# Patient Record
Sex: MALE | Race: WHITE | Employment: FULL TIME | URBAN - NONMETROPOLITAN AREA
[De-identification: names, ages, dates, MRNs, and addresses within clinical notes are randomized per-mention and may not be internally consistent; named-entity substitution may affect disease eponyms.]

---

## 2019-02-06 ENCOUNTER — HOSPITAL ENCOUNTER (EMERGENCY)
Age: 44
Discharge: ANOTHER HOSPITAL | End: 2019-02-06
Attending: EMERGENCY MEDICINE
Payer: COMMERCIAL

## 2019-02-06 ENCOUNTER — APPOINTMENT (OUTPATIENT)
Dept: GENERAL RADIOLOGY | Age: 44
End: 2019-02-06

## 2019-02-06 ENCOUNTER — HOSPITAL ENCOUNTER (INPATIENT)
Age: 44
LOS: 2 days | Discharge: HOME | DRG: 639 | End: 2019-02-08
Attending: INTERNAL MEDICINE | Admitting: INTERNAL MEDICINE

## 2019-02-06 VITALS
BODY MASS INDEX: 31.83 KG/M2 | TEMPERATURE: 99 F | DIASTOLIC BLOOD PRESSURE: 89 MMHG | OXYGEN SATURATION: 98 % | WEIGHT: 210 LBS | RESPIRATION RATE: 16 BRPM | HEART RATE: 105 BPM | SYSTOLIC BLOOD PRESSURE: 140 MMHG | HEIGHT: 68 IN

## 2019-02-06 DIAGNOSIS — E11.10 DKA, TYPE 2, NOT AT GOAL (HCC): Primary | ICD-10-CM

## 2019-02-06 DIAGNOSIS — E11.9 DIABETES MELLITUS, NEW ONSET (HCC): Primary | ICD-10-CM

## 2019-02-06 LAB
A/G RATIO: 1.1 (ref 1.1–2.2)
ALBUMIN SERPL-MCNC: 4.2 G/DL (ref 3.4–5)
ALP BLD-CCNC: 187 U/L (ref 40–129)
ALT SERPL-CCNC: <5 U/L (ref 10–40)
AMPHETAMINE SCREEN, URINE: NORMAL
ANION GAP SERPL CALCULATED.3IONS-SCNC: 17 MMOL/L (ref 3–16)
ANION GAP SERPL CALCULATED.3IONS-SCNC: 22 MMOL/L (ref 3–16)
AST SERPL-CCNC: <5 U/L (ref 15–37)
BACTERIA: ABNORMAL /HPF
BARBITURATE SCREEN URINE: NORMAL
BASE EXCESS ARTERIAL: -6.9 MMOL/L (ref -3–3)
BASOPHILS ABSOLUTE: 0.1 K/UL (ref 0–0.2)
BASOPHILS RELATIVE PERCENT: 0.4 %
BENZODIAZEPINE SCREEN, URINE: NORMAL
BILIRUB SERPL-MCNC: 0.6 MG/DL (ref 0–1)
BILIRUBIN URINE: NEGATIVE
BLOOD, URINE: ABNORMAL
BUN BLDV-MCNC: 10 MG/DL (ref 7–20)
BUN BLDV-MCNC: 7 MG/DL (ref 7–20)
CALCIUM SERPL-MCNC: 8.8 MG/DL (ref 8.3–10.6)
CALCIUM SERPL-MCNC: 9.9 MG/DL (ref 8.3–10.6)
CANNABINOID SCREEN URINE: NORMAL
CARBOXYHEMOGLOBIN ARTERIAL: 3.6 % (ref 0–1.5)
CHLORIDE BLD-SCNC: 84 MMOL/L (ref 99–110)
CHLORIDE BLD-SCNC: 97 MMOL/L (ref 99–110)
CHP ED QC CHECK: NORMAL
CLARITY: CLEAR
CO2: 17 MMOL/L (ref 21–32)
CO2: 19 MMOL/L (ref 21–32)
COCAINE METABOLITE SCREEN URINE: NORMAL
COLOR: ABNORMAL
CREAT SERPL-MCNC: 0.6 MG/DL (ref 0.9–1.3)
CREAT SERPL-MCNC: <0.5 MG/DL (ref 0.9–1.3)
EOSINOPHILS ABSOLUTE: 0.1 K/UL (ref 0–0.6)
EOSINOPHILS RELATIVE PERCENT: 0.6 %
EPITHELIAL CELLS, UA: ABNORMAL /HPF
GFR AFRICAN AMERICAN: >60
GFR AFRICAN AMERICAN: >60
GFR NON-AFRICAN AMERICAN: >60
GFR NON-AFRICAN AMERICAN: >60
GLOBULIN: 4 G/DL
GLUCOSE BLD-MCNC: 243 MG/DL (ref 70–99)
GLUCOSE BLD-MCNC: 255 MG/DL (ref 70–99)
GLUCOSE BLD-MCNC: 320 MG/DL
GLUCOSE BLD-MCNC: 320 MG/DL (ref 70–99)
GLUCOSE BLD-MCNC: 407 MG/DL
GLUCOSE BLD-MCNC: 407 MG/DL (ref 70–99)
GLUCOSE BLD-MCNC: 635 MG/DL (ref 70–99)
GLUCOSE URINE: >=1000 MG/DL
HCO3 ARTERIAL: 17.3 MMOL/L (ref 21–29)
HCT VFR BLD CALC: 44.9 % (ref 40.5–52.5)
HEMOGLOBIN, ART, EXTENDED: 15.7 G/DL (ref 13.5–17.5)
HEMOGLOBIN: 15.7 G/DL (ref 13.5–17.5)
KETONES, URINE: 40 MG/DL
LEUKOCYTE ESTERASE, URINE: NEGATIVE
LIPASE: 120 U/L (ref 13–60)
LYMPHOCYTES ABSOLUTE: 2.7 K/UL (ref 1–5.1)
LYMPHOCYTES RELATIVE PERCENT: 19.1 %
Lab: NORMAL
MCH RBC QN AUTO: 33.6 PG (ref 26–34)
MCHC RBC AUTO-ENTMCNC: 34.9 G/DL (ref 31–36)
MCV RBC AUTO: 96.2 FL (ref 80–100)
METHADONE SCREEN, URINE: NORMAL
METHEMOGLOBIN ARTERIAL: 0 %
MICROSCOPIC EXAMINATION: YES
MONO TEST: NEGATIVE
MONOCYTES ABSOLUTE: 0.9 K/UL (ref 0–1.3)
MONOCYTES RELATIVE PERCENT: 6.5 %
NEUTROPHILS ABSOLUTE: 10.3 K/UL (ref 1.7–7.7)
NEUTROPHILS RELATIVE PERCENT: 73.4 %
NITRITE, URINE: NEGATIVE
O2 CONTENT ARTERIAL: 21 ML/DL
O2 SAT, ARTERIAL: 95.7 %
O2 THERAPY: ABNORMAL
OPIATE SCREEN URINE: NORMAL
OXYCODONE URINE: NORMAL
PCO2 ARTERIAL: 31.9 MMHG (ref 35–45)
PDW BLD-RTO: 12 % (ref 12.4–15.4)
PERFORMED ON: ABNORMAL
PH ARTERIAL: 7.35 (ref 7.35–7.45)
PH UA: 5
PH UA: 5.5
PHENCYCLIDINE SCREEN URINE: NORMAL
PLATELET # BLD: 257 K/UL (ref 135–450)
PMV BLD AUTO: 8.7 FL (ref 5–10.5)
PO2 ARTERIAL: 81.3 MMHG (ref 75–108)
POTASSIUM SERPL-SCNC: 3.6 MMOL/L (ref 3.5–5.1)
POTASSIUM SERPL-SCNC: 4.2 MMOL/L (ref 3.5–5.1)
PROPOXYPHENE SCREEN: NORMAL
PROTEIN UA: NEGATIVE MG/DL
RAPID INFLUENZA  B AGN: NEGATIVE
RAPID INFLUENZA A AGN: NEGATIVE
RBC # BLD: 4.66 M/UL (ref 4.2–5.9)
RBC UA: ABNORMAL /HPF (ref 0–2)
S PYO AG THROAT QL: NEGATIVE
SODIUM BLD-SCNC: 123 MMOL/L (ref 136–145)
SODIUM BLD-SCNC: 133 MMOL/L (ref 136–145)
SPECIFIC GRAVITY UA: <=1.005
TCO2 ARTERIAL: 18.3 MMOL/L
TOTAL CK: 106 U/L (ref 39–308)
TOTAL PROTEIN: 8.2 G/DL (ref 6.4–8.2)
TROPONIN: <0.01 NG/ML
URINE TYPE: ABNORMAL
UROBILINOGEN, URINE: 0.2 E.U./DL
WBC # BLD: 14 K/UL (ref 4–11)
WBC UA: ABNORMAL /HPF (ref 0–5)

## 2019-02-06 PROCEDURE — 87880 STREP A ASSAY W/OPTIC: CPT

## 2019-02-06 PROCEDURE — 96365 THER/PROPH/DIAG IV INF INIT: CPT

## 2019-02-06 PROCEDURE — 36415 COLL VENOUS BLD VENIPUNCTURE: CPT

## 2019-02-06 PROCEDURE — 2000000000 HC ICU R&B

## 2019-02-06 PROCEDURE — 87081 CULTURE SCREEN ONLY: CPT

## 2019-02-06 PROCEDURE — 80053 COMPREHEN METABOLIC PANEL: CPT

## 2019-02-06 PROCEDURE — 93010 ELECTROCARDIOGRAM REPORT: CPT | Performed by: INTERNAL MEDICINE

## 2019-02-06 PROCEDURE — 71046 X-RAY EXAM CHEST 2 VIEWS: CPT

## 2019-02-06 PROCEDURE — 85025 COMPLETE CBC W/AUTO DIFF WBC: CPT

## 2019-02-06 PROCEDURE — 6360000002 HC RX W HCPCS: Performed by: PHYSICIAN ASSISTANT

## 2019-02-06 PROCEDURE — 84484 ASSAY OF TROPONIN QUANT: CPT

## 2019-02-06 PROCEDURE — 82803 BLOOD GASES ANY COMBINATION: CPT

## 2019-02-06 PROCEDURE — 2580000003 HC RX 258: Performed by: PHYSICIAN ASSISTANT

## 2019-02-06 PROCEDURE — 6370000000 HC RX 637 (ALT 250 FOR IP): Performed by: PHYSICIAN ASSISTANT

## 2019-02-06 PROCEDURE — 81001 URINALYSIS AUTO W/SCOPE: CPT

## 2019-02-06 PROCEDURE — 96361 HYDRATE IV INFUSION ADD-ON: CPT

## 2019-02-06 PROCEDURE — 36600 WITHDRAWAL OF ARTERIAL BLOOD: CPT

## 2019-02-06 PROCEDURE — 80048 BASIC METABOLIC PNL TOTAL CA: CPT

## 2019-02-06 PROCEDURE — 83690 ASSAY OF LIPASE: CPT

## 2019-02-06 PROCEDURE — 86308 HETEROPHILE ANTIBODY SCREEN: CPT

## 2019-02-06 PROCEDURE — 83036 HEMOGLOBIN GLYCOSYLATED A1C: CPT

## 2019-02-06 PROCEDURE — 87804 INFLUENZA ASSAY W/OPTIC: CPT

## 2019-02-06 PROCEDURE — 80307 DRUG TEST PRSMV CHEM ANLYZR: CPT

## 2019-02-06 PROCEDURE — 83735 ASSAY OF MAGNESIUM: CPT

## 2019-02-06 PROCEDURE — 82550 ASSAY OF CK (CPK): CPT

## 2019-02-06 PROCEDURE — 84100 ASSAY OF PHOSPHORUS: CPT

## 2019-02-06 PROCEDURE — 96375 TX/PRO/DX INJ NEW DRUG ADDON: CPT

## 2019-02-06 PROCEDURE — 99285 EMERGENCY DEPT VISIT HI MDM: CPT

## 2019-02-06 PROCEDURE — 93005 ELECTROCARDIOGRAM TRACING: CPT | Performed by: PHYSICIAN ASSISTANT

## 2019-02-06 RX ORDER — DEXTROSE MONOHYDRATE 50 MG/ML
100 INJECTION, SOLUTION INTRAVENOUS PRN
Status: DISCONTINUED | OUTPATIENT
Start: 2019-02-06 | End: 2019-02-06 | Stop reason: HOSPADM

## 2019-02-06 RX ORDER — 0.9 % SODIUM CHLORIDE 0.9 %
1000 INTRAVENOUS SOLUTION INTRAVENOUS ONCE
Status: COMPLETED | OUTPATIENT
Start: 2019-02-06 | End: 2019-02-06

## 2019-02-06 RX ORDER — ACETAMINOPHEN,DIPHENHYDRAMINE HCL 500; 25 MG/1; MG/1
1 TABLET, FILM COATED ORAL NIGHTLY PRN
COMMUNITY

## 2019-02-06 RX ORDER — ONDANSETRON 2 MG/ML
4 INJECTION INTRAMUSCULAR; INTRAVENOUS ONCE
Status: COMPLETED | OUTPATIENT
Start: 2019-02-06 | End: 2019-02-06

## 2019-02-06 RX ORDER — DEXTROSE MONOHYDRATE 25 G/50ML
12.5 INJECTION, SOLUTION INTRAVENOUS PRN
Status: DISCONTINUED | OUTPATIENT
Start: 2019-02-06 | End: 2019-02-06 | Stop reason: HOSPADM

## 2019-02-06 RX ORDER — NICOTINE POLACRILEX 4 MG
15 LOZENGE BUCCAL PRN
Status: DISCONTINUED | OUTPATIENT
Start: 2019-02-06 | End: 2019-02-06 | Stop reason: HOSPADM

## 2019-02-06 RX ORDER — ESOMEPRAZOLE MAGNESIUM 20 MG/1
20 FOR SUSPENSION ORAL DAILY
COMMUNITY
End: 2021-06-09

## 2019-02-06 RX ADMIN — SODIUM CHLORIDE 1000 ML: 9 INJECTION, SOLUTION INTRAVENOUS at 18:53

## 2019-02-06 RX ADMIN — SODIUM CHLORIDE 1000 ML: 9 INJECTION, SOLUTION INTRAVENOUS at 19:55

## 2019-02-06 RX ADMIN — SODIUM CHLORIDE 1000 ML: 9 INJECTION, SOLUTION INTRAVENOUS at 21:08

## 2019-02-06 RX ADMIN — ONDANSETRON 4 MG: 2 INJECTION INTRAMUSCULAR; INTRAVENOUS at 18:53

## 2019-02-06 RX ADMIN — SODIUM CHLORIDE 0.5 UNITS/HR: 900 INJECTION INTRAVENOUS at 21:08

## 2019-02-06 ASSESSMENT — PAIN DESCRIPTION - PAIN TYPE: TYPE: ACUTE PAIN

## 2019-02-06 ASSESSMENT — PAIN DESCRIPTION - ORIENTATION: ORIENTATION: RIGHT;LEFT

## 2019-02-06 ASSESSMENT — PAIN DESCRIPTION - FREQUENCY
FREQUENCY: CONTINUOUS
FREQUENCY: CONTINUOUS

## 2019-02-06 ASSESSMENT — PAIN DESCRIPTION - LOCATION
LOCATION: ABDOMEN;BACK;CHEST;LEG
LOCATION: ABDOMEN;BUTTOCKS

## 2019-02-06 ASSESSMENT — PAIN DESCRIPTION - PROGRESSION
CLINICAL_PROGRESSION: NOT CHANGED
CLINICAL_PROGRESSION: NOT CHANGED

## 2019-02-06 ASSESSMENT — PAIN SCALES - GENERAL
PAINLEVEL_OUTOF10: 7
PAINLEVEL_OUTOF10: 4

## 2019-02-06 ASSESSMENT — PAIN DESCRIPTION - DESCRIPTORS
DESCRIPTORS: JABBING
DESCRIPTORS: PRESSURE;ACHING

## 2019-02-06 ASSESSMENT — ENCOUNTER SYMPTOMS
SORE THROAT: 1
NAUSEA: 1
COUGH: 1
ABDOMINAL PAIN: 1
SHORTNESS OF BREATH: 1
VISUAL CHANGE: 1
VOICE CHANGE: 0
VOMITING: 1
BACK PAIN: 1

## 2019-02-07 LAB
ANION GAP SERPL CALCULATED.3IONS-SCNC: 10 MMOL/L (ref 3–16)
ANION GAP SERPL CALCULATED.3IONS-SCNC: 11 MMOL/L (ref 3–16)
ANION GAP SERPL CALCULATED.3IONS-SCNC: 12 MMOL/L (ref 3–16)
ANION GAP SERPL CALCULATED.3IONS-SCNC: 12 MMOL/L (ref 3–16)
ANION GAP SERPL CALCULATED.3IONS-SCNC: 13 MMOL/L (ref 3–16)
BUN BLDV-MCNC: 6 MG/DL (ref 7–20)
BUN BLDV-MCNC: 7 MG/DL (ref 7–20)
BUN BLDV-MCNC: 7 MG/DL (ref 7–20)
CALCIUM SERPL-MCNC: 8.1 MG/DL (ref 8.3–10.6)
CALCIUM SERPL-MCNC: 8.2 MG/DL (ref 8.3–10.6)
CALCIUM SERPL-MCNC: 8.2 MG/DL (ref 8.3–10.6)
CALCIUM SERPL-MCNC: 8.5 MG/DL (ref 8.3–10.6)
CALCIUM SERPL-MCNC: 8.6 MG/DL (ref 8.3–10.6)
CHLORIDE BLD-SCNC: 100 MMOL/L (ref 99–110)
CHLORIDE BLD-SCNC: 100 MMOL/L (ref 99–110)
CHLORIDE BLD-SCNC: 101 MMOL/L (ref 99–110)
CHLORIDE BLD-SCNC: 98 MMOL/L (ref 99–110)
CHLORIDE BLD-SCNC: 99 MMOL/L (ref 99–110)
CO2: 17 MMOL/L (ref 21–32)
CO2: 18 MMOL/L (ref 21–32)
CO2: 20 MMOL/L (ref 21–32)
CO2: 20 MMOL/L (ref 21–32)
CO2: 21 MMOL/L (ref 21–32)
CREAT SERPL-MCNC: 0.6 MG/DL (ref 0.9–1.3)
CREAT SERPL-MCNC: <0.5 MG/DL (ref 0.9–1.3)
EKG ATRIAL RATE: 108 BPM
EKG DIAGNOSIS: NORMAL
EKG P AXIS: 69 DEGREES
EKG P-R INTERVAL: 138 MS
EKG Q-T INTERVAL: 342 MS
EKG QRS DURATION: 88 MS
EKG QTC CALCULATION (BAZETT): 458 MS
EKG R AXIS: 64 DEGREES
EKG T AXIS: 47 DEGREES
EKG VENTRICULAR RATE: 108 BPM
ESTIMATED AVERAGE GLUCOSE: 286.2 MG/DL
ESTIMATED AVERAGE GLUCOSE: 294.8 MG/DL
GFR AFRICAN AMERICAN: >60
GFR NON-AFRICAN AMERICAN: >60
GLUCOSE BLD-MCNC: 149 MG/DL (ref 70–99)
GLUCOSE BLD-MCNC: 160 MG/DL (ref 70–99)
GLUCOSE BLD-MCNC: 160 MG/DL (ref 70–99)
GLUCOSE BLD-MCNC: 167 MG/DL (ref 70–99)
GLUCOSE BLD-MCNC: 181 MG/DL (ref 70–99)
GLUCOSE BLD-MCNC: 184 MG/DL (ref 70–99)
GLUCOSE BLD-MCNC: 186 MG/DL (ref 70–99)
GLUCOSE BLD-MCNC: 187 MG/DL (ref 70–99)
GLUCOSE BLD-MCNC: 189 MG/DL (ref 70–99)
GLUCOSE BLD-MCNC: 201 MG/DL (ref 70–99)
GLUCOSE BLD-MCNC: 206 MG/DL (ref 70–99)
GLUCOSE BLD-MCNC: 215 MG/DL (ref 70–99)
GLUCOSE BLD-MCNC: 218 MG/DL (ref 70–99)
GLUCOSE BLD-MCNC: 233 MG/DL (ref 70–99)
GLUCOSE BLD-MCNC: 233 MG/DL (ref 70–99)
GLUCOSE BLD-MCNC: 276 MG/DL (ref 70–99)
GLUCOSE BLD-MCNC: 277 MG/DL (ref 70–99)
GLUCOSE BLD-MCNC: 312 MG/DL (ref 70–99)
GLUCOSE BLD-MCNC: 342 MG/DL (ref 70–99)
HBA1C MFR BLD: 11.6 %
HBA1C MFR BLD: 11.9 %
MAGNESIUM: 1.7 MG/DL (ref 1.8–2.4)
MAGNESIUM: 1.8 MG/DL (ref 1.8–2.4)
MAGNESIUM: 1.9 MG/DL (ref 1.8–2.4)
MAGNESIUM: 2.1 MG/DL (ref 1.8–2.4)
PERFORMED ON: ABNORMAL
PHOSPHORUS: 1.8 MG/DL (ref 2.5–4.9)
PHOSPHORUS: 2 MG/DL (ref 2.5–4.9)
PHOSPHORUS: 2.4 MG/DL (ref 2.5–4.9)
PHOSPHORUS: 2.5 MG/DL (ref 2.5–4.9)
PHOSPHORUS: 2.9 MG/DL (ref 2.5–4.9)
PHOSPHORUS: 3.3 MG/DL (ref 2.5–4.9)
POTASSIUM SERPL-SCNC: 3.6 MMOL/L (ref 3.5–5.1)
POTASSIUM SERPL-SCNC: 3.7 MMOL/L (ref 3.5–5.1)
POTASSIUM SERPL-SCNC: 3.9 MMOL/L (ref 3.5–5.1)
SODIUM BLD-SCNC: 128 MMOL/L (ref 136–145)
SODIUM BLD-SCNC: 129 MMOL/L (ref 136–145)
SODIUM BLD-SCNC: 130 MMOL/L (ref 136–145)
SODIUM BLD-SCNC: 132 MMOL/L (ref 136–145)
SODIUM BLD-SCNC: 133 MMOL/L (ref 136–145)

## 2019-02-07 PROCEDURE — 84100 ASSAY OF PHOSPHORUS: CPT

## 2019-02-07 PROCEDURE — 2580000003 HC RX 258

## 2019-02-07 PROCEDURE — 6370000000 HC RX 637 (ALT 250 FOR IP): Performed by: INTERNAL MEDICINE

## 2019-02-07 PROCEDURE — 6360000002 HC RX W HCPCS: Performed by: INTERNAL MEDICINE

## 2019-02-07 PROCEDURE — 83036 HEMOGLOBIN GLYCOSYLATED A1C: CPT

## 2019-02-07 PROCEDURE — 94664 DEMO&/EVAL PT USE INHALER: CPT

## 2019-02-07 PROCEDURE — 2500000003 HC RX 250 WO HCPCS: Performed by: INTERNAL MEDICINE

## 2019-02-07 PROCEDURE — 80048 BASIC METABOLIC PNL TOTAL CA: CPT

## 2019-02-07 PROCEDURE — 99406 BEHAV CHNG SMOKING 3-10 MIN: CPT

## 2019-02-07 PROCEDURE — 6370000000 HC RX 637 (ALT 250 FOR IP): Performed by: NURSE PRACTITIONER

## 2019-02-07 PROCEDURE — G0008 ADMIN INFLUENZA VIRUS VAC: HCPCS | Performed by: INTERNAL MEDICINE

## 2019-02-07 PROCEDURE — 2580000003 HC RX 258: Performed by: INTERNAL MEDICINE

## 2019-02-07 PROCEDURE — 36415 COLL VENOUS BLD VENIPUNCTURE: CPT

## 2019-02-07 PROCEDURE — 1200000000 HC SEMI PRIVATE

## 2019-02-07 PROCEDURE — 83735 ASSAY OF MAGNESIUM: CPT

## 2019-02-07 PROCEDURE — 90686 IIV4 VACC NO PRSV 0.5 ML IM: CPT | Performed by: INTERNAL MEDICINE

## 2019-02-07 RX ORDER — SODIUM CHLORIDE 9 MG/ML
INJECTION, SOLUTION INTRAVENOUS CONTINUOUS
Status: DISCONTINUED | OUTPATIENT
Start: 2019-02-07 | End: 2019-02-07

## 2019-02-07 RX ORDER — POTASSIUM CHLORIDE 7.45 MG/ML
10 INJECTION INTRAVENOUS PRN
Status: DISCONTINUED | OUTPATIENT
Start: 2019-02-07 | End: 2019-02-08 | Stop reason: HOSPADM

## 2019-02-07 RX ORDER — NICOTINE 21 MG/24HR
1 PATCH, TRANSDERMAL 24 HOURS TRANSDERMAL EVERY 24 HOURS
Status: DISCONTINUED | OUTPATIENT
Start: 2019-02-07 | End: 2019-02-08 | Stop reason: HOSPADM

## 2019-02-07 RX ORDER — DEXTROSE MONOHYDRATE 25 G/50ML
12.5 INJECTION, SOLUTION INTRAVENOUS PRN
Status: DISCONTINUED | OUTPATIENT
Start: 2019-02-07 | End: 2019-02-08 | Stop reason: HOSPADM

## 2019-02-07 RX ORDER — DEXTROSE AND SODIUM CHLORIDE 5; .45 G/100ML; G/100ML
INJECTION, SOLUTION INTRAVENOUS CONTINUOUS PRN
Status: DISCONTINUED | OUTPATIENT
Start: 2019-02-07 | End: 2019-02-08 | Stop reason: HOSPADM

## 2019-02-07 RX ORDER — CALCIUM CARBONATE 200(500)MG
500 TABLET,CHEWABLE ORAL 3 TIMES DAILY PRN
Status: DISCONTINUED | OUTPATIENT
Start: 2019-02-07 | End: 2019-02-08 | Stop reason: HOSPADM

## 2019-02-07 RX ORDER — ACETAMINOPHEN 325 MG/1
650 TABLET ORAL EVERY 4 HOURS PRN
Status: DISCONTINUED | OUTPATIENT
Start: 2019-02-07 | End: 2019-02-08 | Stop reason: HOSPADM

## 2019-02-07 RX ORDER — SODIUM CHLORIDE 0.9 % (FLUSH) 0.9 %
SYRINGE (ML) INJECTION
Status: COMPLETED
Start: 2019-02-07 | End: 2019-02-07

## 2019-02-07 RX ORDER — INSULIN GLARGINE 100 [IU]/ML
15 INJECTION, SOLUTION SUBCUTANEOUS DAILY
Status: DISCONTINUED | OUTPATIENT
Start: 2019-02-07 | End: 2019-02-08

## 2019-02-07 RX ORDER — MAGNESIUM SULFATE 1 G/100ML
1 INJECTION INTRAVENOUS PRN
Status: DISCONTINUED | OUTPATIENT
Start: 2019-02-07 | End: 2019-02-08 | Stop reason: HOSPADM

## 2019-02-07 RX ADMIN — ANTACID TABLETS 500 MG: 500 TABLET, CHEWABLE ORAL at 22:07

## 2019-02-07 RX ADMIN — INSULIN LISPRO 6 UNITS: 100 INJECTION, SOLUTION INTRAVENOUS; SUBCUTANEOUS at 16:09

## 2019-02-07 RX ADMIN — INSULIN GLARGINE 15 UNITS: 100 INJECTION, SOLUTION SUBCUTANEOUS at 09:42

## 2019-02-07 RX ADMIN — Medication 2 MG: at 22:07

## 2019-02-07 RX ADMIN — MAGNESIUM SULFATE HEPTAHYDRATE 1 G: 1 INJECTION, SOLUTION INTRAVENOUS at 12:36

## 2019-02-07 RX ADMIN — POTASSIUM CHLORIDE 10 MEQ: 7.46 INJECTION, SOLUTION INTRAVENOUS at 06:45

## 2019-02-07 RX ADMIN — POTASSIUM CHLORIDE 10 MEQ: 7.46 INJECTION, SOLUTION INTRAVENOUS at 05:08

## 2019-02-07 RX ADMIN — POTASSIUM CHLORIDE 10 MEQ: 7.46 INJECTION, SOLUTION INTRAVENOUS at 02:21

## 2019-02-07 RX ADMIN — ENOXAPARIN SODIUM 40 MG: 40 INJECTION SUBCUTANEOUS at 08:37

## 2019-02-07 RX ADMIN — DEXTROSE AND SODIUM CHLORIDE: 5; 450 INJECTION, SOLUTION INTRAVENOUS at 13:05

## 2019-02-07 RX ADMIN — MUPIROCIN: 20 OINTMENT TOPICAL at 08:30

## 2019-02-07 RX ADMIN — POTASSIUM CHLORIDE 10 MEQ: 7.46 INJECTION, SOLUTION INTRAVENOUS at 01:03

## 2019-02-07 RX ADMIN — POTASSIUM CHLORIDE 10 MEQ: 7.46 INJECTION, SOLUTION INTRAVENOUS at 06:13

## 2019-02-07 RX ADMIN — INSULIN LISPRO 4 UNITS: 100 INJECTION, SOLUTION INTRAVENOUS; SUBCUTANEOUS at 20:24

## 2019-02-07 RX ADMIN — INFLUENZA A VIRUS A/MICHIGAN/45/2015 X-275 (H1N1) ANTIGEN (FORMALDEHYDE INACTIVATED), INFLUENZA A VIRUS A/SINGAPORE/INFIMH-16-0019/2016 IVR-186 (H3N2) ANTIGEN (FORMALDEHYDE INACTIVATED), INFLUENZA B VIRUS B/PHUKET/3073/2013 ANTIGEN (FORMALDEHYDE INACTIVATED), AND INFLUENZA B VIRUS B/MARYLAND/15/2016 BX-69A ANTIGEN (FORMALDEHYDE INACTIVATED) 0.5 ML: 15; 15; 15; 15 INJECTION, SUSPENSION INTRAMUSCULAR at 10:54

## 2019-02-07 RX ADMIN — SODIUM GLYCOLATE 15 MMOL: 216 INJECTION, SOLUTION INTRAVENOUS at 08:36

## 2019-02-07 RX ADMIN — DEXTROSE AND SODIUM CHLORIDE: 5; 450 INJECTION, SOLUTION INTRAVENOUS at 06:45

## 2019-02-07 RX ADMIN — MAGNESIUM SULFATE HEPTAHYDRATE 1 G: 1 INJECTION, SOLUTION INTRAVENOUS at 11:32

## 2019-02-07 RX ADMIN — SODIUM CHLORIDE, PRESERVATIVE FREE 10 ML: 5 INJECTION INTRAVENOUS at 11:04

## 2019-02-07 RX ADMIN — INSULIN LISPRO 4 UNITS: 100 INJECTION, SOLUTION INTRAVENOUS; SUBCUTANEOUS at 12:02

## 2019-02-07 RX ADMIN — SODIUM CHLORIDE: 9 INJECTION, SOLUTION INTRAVENOUS at 00:33

## 2019-02-07 RX ADMIN — SODIUM CHLORIDE 0.1 UNITS/KG/HR: 9 INJECTION, SOLUTION INTRAVENOUS at 00:46

## 2019-02-07 RX ADMIN — DEXTROSE AND SODIUM CHLORIDE: 5; 450 INJECTION, SOLUTION INTRAVENOUS at 00:56

## 2019-02-07 RX ADMIN — POTASSIUM CHLORIDE 10 MEQ: 7.46 INJECTION, SOLUTION INTRAVENOUS at 00:35

## 2019-02-07 RX ADMIN — ACETAMINOPHEN 650 MG: 325 TABLET ORAL at 04:07

## 2019-02-07 RX ADMIN — ACETAMINOPHEN 650 MG: 325 TABLET ORAL at 10:54

## 2019-02-07 ASSESSMENT — PAIN DESCRIPTION - PAIN TYPE
TYPE: ACUTE PAIN
TYPE: CHRONIC PAIN
TYPE: CHRONIC PAIN

## 2019-02-07 ASSESSMENT — PAIN DESCRIPTION - DESCRIPTORS
DESCRIPTORS: ACHING;HEADACHE
DESCRIPTORS: ACHING

## 2019-02-07 ASSESSMENT — PAIN DESCRIPTION - LOCATION
LOCATION: BACK;HEAD
LOCATION: BACK;NECK
LOCATION: BACK;NECK

## 2019-02-07 ASSESSMENT — PAIN DESCRIPTION - ORIENTATION: ORIENTATION: MID

## 2019-02-07 ASSESSMENT — PAIN DESCRIPTION - FREQUENCY: FREQUENCY: CONTINUOUS

## 2019-02-07 ASSESSMENT — PAIN SCALES - GENERAL
PAINLEVEL_OUTOF10: 4
PAINLEVEL_OUTOF10: 0
PAINLEVEL_OUTOF10: 7
PAINLEVEL_OUTOF10: 4
PAINLEVEL_OUTOF10: 2
PAINLEVEL_OUTOF10: 0

## 2019-02-07 ASSESSMENT — PAIN DESCRIPTION - PROGRESSION
CLINICAL_PROGRESSION: NOT CHANGED
CLINICAL_PROGRESSION: NOT CHANGED

## 2019-02-07 ASSESSMENT — PAIN - FUNCTIONAL ASSESSMENT: PAIN_FUNCTIONAL_ASSESSMENT: ACTIVITIES ARE NOT PREVENTED

## 2019-02-08 VITALS
HEART RATE: 110 BPM | DIASTOLIC BLOOD PRESSURE: 99 MMHG | RESPIRATION RATE: 16 BRPM | WEIGHT: 200.6 LBS | SYSTOLIC BLOOD PRESSURE: 149 MMHG | BODY MASS INDEX: 30.4 KG/M2 | OXYGEN SATURATION: 97 % | HEIGHT: 68 IN | TEMPERATURE: 97.3 F

## 2019-02-08 LAB
ANION GAP SERPL CALCULATED.3IONS-SCNC: 17 MMOL/L (ref 3–16)
BUN BLDV-MCNC: 8 MG/DL (ref 7–20)
CALCIUM SERPL-MCNC: 8.9 MG/DL (ref 8.3–10.6)
CHLORIDE BLD-SCNC: 98 MMOL/L (ref 99–110)
CO2: 17 MMOL/L (ref 21–32)
CREAT SERPL-MCNC: <0.5 MG/DL (ref 0.9–1.3)
ESTIMATED AVERAGE GLUCOSE: 289.1 MG/DL
GFR AFRICAN AMERICAN: >60
GFR NON-AFRICAN AMERICAN: >60
GLUCOSE BLD-MCNC: 233 MG/DL (ref 70–99)
GLUCOSE BLD-MCNC: 255 MG/DL (ref 70–99)
HBA1C MFR BLD: 11.7 %
MAGNESIUM: 2 MG/DL (ref 1.8–2.4)
PERFORMED ON: ABNORMAL
PHOSPHORUS: 2.8 MG/DL (ref 2.5–4.9)
POTASSIUM SERPL-SCNC: 3.6 MMOL/L (ref 3.5–5.1)
SODIUM BLD-SCNC: 132 MMOL/L (ref 136–145)

## 2019-02-08 PROCEDURE — 6370000000 HC RX 637 (ALT 250 FOR IP): Performed by: INTERNAL MEDICINE

## 2019-02-08 PROCEDURE — 83735 ASSAY OF MAGNESIUM: CPT

## 2019-02-08 PROCEDURE — 84100 ASSAY OF PHOSPHORUS: CPT

## 2019-02-08 PROCEDURE — 80048 BASIC METABOLIC PNL TOTAL CA: CPT

## 2019-02-08 PROCEDURE — 6370000000 HC RX 637 (ALT 250 FOR IP): Performed by: NURSE PRACTITIONER

## 2019-02-08 PROCEDURE — 36415 COLL VENOUS BLD VENIPUNCTURE: CPT

## 2019-02-08 RX ORDER — NICOTINE POLACRILEX 4 MG
15 LOZENGE BUCCAL PRN
Status: DISCONTINUED | OUTPATIENT
Start: 2019-02-08 | End: 2019-02-08 | Stop reason: HOSPADM

## 2019-02-08 RX ORDER — DEXTROSE MONOHYDRATE 25 G/50ML
12.5 INJECTION, SOLUTION INTRAVENOUS PRN
Status: DISCONTINUED | OUTPATIENT
Start: 2019-02-08 | End: 2019-02-08 | Stop reason: HOSPADM

## 2019-02-08 RX ORDER — DEXTROSE MONOHYDRATE 50 MG/ML
100 INJECTION, SOLUTION INTRAVENOUS PRN
Status: DISCONTINUED | OUTPATIENT
Start: 2019-02-08 | End: 2019-02-08 | Stop reason: HOSPADM

## 2019-02-08 RX ORDER — NICOTINE 21 MG/24HR
1 PATCH, TRANSDERMAL 24 HOURS TRANSDERMAL EVERY 24 HOURS
Qty: 30 PATCH | Refills: 3 | Status: SHIPPED | OUTPATIENT
Start: 2019-02-09 | End: 2021-06-09

## 2019-02-08 RX ORDER — PEN NEEDLE, DIABETIC 30 GX5/16"
1 NEEDLE, DISPOSABLE MISCELLANEOUS DAILY
Qty: 100 EACH | Refills: 3 | Status: SHIPPED | OUTPATIENT
Start: 2019-02-08

## 2019-02-08 RX ORDER — INSULIN GLARGINE 100 [IU]/ML
18 INJECTION, SOLUTION SUBCUTANEOUS DAILY
Status: DISCONTINUED | OUTPATIENT
Start: 2019-02-08 | End: 2019-02-08 | Stop reason: HOSPADM

## 2019-02-08 RX ORDER — PANTOPRAZOLE SODIUM 40 MG/1
40 TABLET, DELAYED RELEASE ORAL
Status: DISCONTINUED | OUTPATIENT
Start: 2019-02-08 | End: 2019-02-08 | Stop reason: HOSPADM

## 2019-02-08 RX ADMIN — INSULIN GLARGINE 18 UNITS: 100 INJECTION, SOLUTION SUBCUTANEOUS at 09:17

## 2019-02-08 RX ADMIN — ACETAMINOPHEN 650 MG: 325 TABLET ORAL at 06:36

## 2019-02-08 RX ADMIN — PANTOPRAZOLE SODIUM 40 MG: 40 TABLET, DELAYED RELEASE ORAL at 05:05

## 2019-02-08 RX ADMIN — INSULIN LISPRO 6 UNITS: 100 INJECTION, SOLUTION INTRAVENOUS; SUBCUTANEOUS at 09:16

## 2019-02-08 ASSESSMENT — PAIN SCALES - GENERAL
PAINLEVEL_OUTOF10: 0
PAINLEVEL_OUTOF10: 8

## 2019-02-09 LAB — S PYO THROAT QL CULT: NORMAL

## 2019-08-21 ENCOUNTER — HOSPITAL ENCOUNTER (EMERGENCY)
Age: 44
Discharge: HOME | End: 2019-08-21
Payer: COMMERCIAL

## 2019-08-21 ENCOUNTER — APPOINTMENT (OUTPATIENT)
Dept: GENERAL RADIOLOGY | Age: 44
End: 2019-08-21
Payer: COMMERCIAL

## 2019-08-21 VITALS
WEIGHT: 210 LBS | TEMPERATURE: 98.6 F | RESPIRATION RATE: 16 BRPM | HEART RATE: 98 BPM | DIASTOLIC BLOOD PRESSURE: 95 MMHG | HEIGHT: 68 IN | BODY MASS INDEX: 31.83 KG/M2 | OXYGEN SATURATION: 97 % | SYSTOLIC BLOOD PRESSURE: 168 MMHG

## 2019-08-21 DIAGNOSIS — R03.0 ELEVATED BLOOD PRESSURE READING: ICD-10-CM

## 2019-08-21 DIAGNOSIS — S63.501A RIGHT WRIST SPRAIN, INITIAL ENCOUNTER: Primary | ICD-10-CM

## 2019-08-21 PROCEDURE — 99283 EMERGENCY DEPT VISIT LOW MDM: CPT

## 2019-08-21 PROCEDURE — 6370000000 HC RX 637 (ALT 250 FOR IP): Performed by: PHYSICIAN ASSISTANT

## 2019-08-21 PROCEDURE — 73130 X-RAY EXAM OF HAND: CPT

## 2019-08-21 RX ORDER — IBUPROFEN 800 MG/1
800 TABLET ORAL EVERY 6 HOURS PRN
Qty: 20 TABLET | Refills: 0 | Status: SHIPPED | OUTPATIENT
Start: 2019-08-21 | End: 2022-09-17

## 2019-08-21 RX ORDER — IBUPROFEN 400 MG/1
800 TABLET ORAL ONCE
Status: COMPLETED | OUTPATIENT
Start: 2019-08-21 | End: 2019-08-21

## 2019-08-21 RX ADMIN — IBUPROFEN 800 MG: 400 TABLET ORAL at 18:50

## 2019-08-21 ASSESSMENT — PAIN SCALES - GENERAL
PAINLEVEL_OUTOF10: 8
PAINLEVEL_OUTOF10: 8

## 2019-08-21 ASSESSMENT — PAIN DESCRIPTION - DESCRIPTORS: DESCRIPTORS: ACHING;CONSTANT;DULL

## 2019-08-21 ASSESSMENT — PAIN DESCRIPTION - LOCATION: LOCATION: HAND;WRIST

## 2019-08-21 ASSESSMENT — PAIN DESCRIPTION - ORIENTATION: ORIENTATION: RIGHT

## 2019-08-21 ASSESSMENT — PAIN DESCRIPTION - PAIN TYPE: TYPE: ACUTE PAIN
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
## 2020-05-15 ENCOUNTER — TELEPHONE (OUTPATIENT)
Dept: INTERNAL MEDICINE CLINIC | Age: 45
End: 2020-05-15

## 2020-05-15 NOTE — TELEPHONE ENCOUNTER
----- Message from Candy Dang MD sent at 5/15/2020 11:44 AM EDT -----  Contact: Spouse [phone]  We never saw him , he saw different [facility]   Its not me  ----- Message -----  From: Arminda Rogel  Sent: 5/15/2020  11:28 AM EDT  To: Candy Dang MD    Hospital pt-  Pts spouse called because he was given Basaglar and needs a refill. He has no PCP. Are you able to call in a refill? [facility]  Spouse [phone]

## 2021-05-10 ENCOUNTER — HOSPITAL ENCOUNTER (OUTPATIENT)
Age: 46
Discharge: HOME | End: 2021-05-10
Payer: MEDICARE

## 2021-05-10 ENCOUNTER — HOSPITAL ENCOUNTER (OUTPATIENT)
Dept: GENERAL RADIOLOGY | Age: 46
Discharge: HOME | End: 2021-05-10
Payer: MEDICARE

## 2021-05-10 DIAGNOSIS — R06.2 WHEEZING: ICD-10-CM

## 2021-05-10 PROCEDURE — 71046 X-RAY EXAM CHEST 2 VIEWS: CPT

## 2021-05-15 ENCOUNTER — APPOINTMENT (OUTPATIENT)
Dept: GENERAL RADIOLOGY | Age: 46
End: 2021-05-15
Payer: COMMERCIAL

## 2021-05-15 ENCOUNTER — HOSPITAL ENCOUNTER (EMERGENCY)
Age: 46
Discharge: HOME | End: 2021-05-15
Attending: EMERGENCY MEDICINE
Payer: COMMERCIAL

## 2021-05-15 VITALS
DIASTOLIC BLOOD PRESSURE: 86 MMHG | TEMPERATURE: 98.5 F | SYSTOLIC BLOOD PRESSURE: 148 MMHG | WEIGHT: 210 LBS | BODY MASS INDEX: 31.83 KG/M2 | HEIGHT: 68 IN | RESPIRATION RATE: 16 BRPM | HEART RATE: 76 BPM | OXYGEN SATURATION: 98 %

## 2021-05-15 DIAGNOSIS — S96.911A STRAIN OF RIGHT FOOT, INITIAL ENCOUNTER: ICD-10-CM

## 2021-05-15 DIAGNOSIS — S86.912A MUSCLE STRAIN OF LEFT LOWER LEG, INITIAL ENCOUNTER: ICD-10-CM

## 2021-05-15 DIAGNOSIS — S63.501A SPRAIN OF RIGHT WRIST, INITIAL ENCOUNTER: ICD-10-CM
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
## 2021-05-15 PROCEDURE — 73110 X-RAY EXAM OF WRIST: CPT

## 2021-05-15 PROCEDURE — 73630 X-RAY EXAM OF FOOT: CPT

## 2021-05-15 PROCEDURE — 96372 THER/PROPH/DIAG INJ SC/IM: CPT

## 2021-05-15 PROCEDURE — 6360000002 HC RX W HCPCS: Performed by: EMERGENCY MEDICINE

## 2021-05-15 PROCEDURE — 73590 X-RAY EXAM OF LOWER LEG: CPT

## 2021-05-15 PROCEDURE — 99285 EMERGENCY DEPT VISIT HI MDM: CPT

## 2021-05-15 RX ORDER — KETOROLAC TROMETHAMINE 30 MG/ML
30 INJECTION, SOLUTION INTRAMUSCULAR; INTRAVENOUS ONCE
Status: COMPLETED | OUTPATIENT
Start: 2021-05-15 | End: 2021-05-15

## 2021-05-15 RX ADMIN — KETOROLAC TROMETHAMINE 30 MG: 30 INJECTION, SOLUTION INTRAMUSCULAR; INTRAVENOUS at 08:03

## 2021-05-15 ASSESSMENT — PAIN SCALES - GENERAL: PAINLEVEL_OUTOF10: 6

## 2021-05-15 ASSESSMENT — PAIN DESCRIPTION - FREQUENCY: FREQUENCY: CONTINUOUS

## 2021-05-15 NOTE — ED NOTES
AVS provided and reviewed with the patient. The patient verbalized understanding of care at home, follow up care, and emergent symptoms to return for. No questions or concerns verbalized at this time. The patient is alert, oriented, stable, and ambulatory out of the department at the time of discharge.        Elisabeth Adrian RN  05/15/21 5795
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
## 2021-05-15 NOTE — ED NOTES
Patient ambulatory out of the department \"to smoke\" while waiting for results.        Td Calloway RN  05/15/21 0671

## 2021-05-15 NOTE — ED NOTES
Ambulatory out of the room to ask the physician what he is waiting for. Dr. Lon Fernandez informed the patient that she is caring for a critical patient and will be in to speak with him as soon as she can. Patient then ambulatory to the restroom and returned to room.      Kieran Jha RN  05/15/21 6719

## 2021-05-27 ENCOUNTER — OFFICE VISIT (OUTPATIENT)
Dept: ORTHOPEDIC SURGERY | Age: 46
End: 2021-05-27
Payer: MEDICARE

## 2021-05-27 VITALS — BODY MASS INDEX: 31.83 KG/M2 | WEIGHT: 210 LBS | HEIGHT: 68 IN

## 2021-05-27 DIAGNOSIS — S93.601A SPRAIN OF RIGHT FOOT, INITIAL ENCOUNTER: Primary | ICD-10-CM

## 2021-05-27 PROCEDURE — 4004F PT TOBACCO SCREEN RCVD TLK: CPT | Performed by: NURSE PRACTITIONER

## 2021-05-27 PROCEDURE — G8427 DOCREV CUR MEDS BY ELIG CLIN: HCPCS | Performed by: NURSE PRACTITIONER

## 2021-05-27 PROCEDURE — 99203 OFFICE O/P NEW LOW 30 MIN: CPT | Performed by: NURSE PRACTITIONER

## 2021-05-27 PROCEDURE — G8419 CALC BMI OUT NRM PARAM NOF/U: HCPCS | Performed by: NURSE PRACTITIONER

## 2021-05-27 RX ORDER — LISINOPRIL 20 MG/1
TABLET ORAL
COMMUNITY
Start: 2021-05-26

## 2021-05-27 RX ORDER — MELOXICAM 15 MG/1
TABLET ORAL
COMMUNITY
Start: 2021-05-18 | End: 2022-09-17

## 2021-05-27 RX ORDER — CYCLOBENZAPRINE HCL 10 MG
TABLET ORAL
COMMUNITY
Start: 2021-05-18 | End: 2022-09-17

## 2021-05-27 RX ORDER — ALBUTEROL SULFATE 90 UG/1
AEROSOL, METERED RESPIRATORY (INHALATION)
COMMUNITY
Start: 2021-05-10
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
## 2021-06-09 ENCOUNTER — OFFICE VISIT (OUTPATIENT)
Dept: ORTHOPEDIC SURGERY | Age: 46
End: 2021-06-09
Payer: MEDICARE

## 2021-06-09 VITALS — WEIGHT: 210 LBS | BODY MASS INDEX: 31.83 KG/M2 | HEIGHT: 68 IN

## 2021-06-09 DIAGNOSIS — M79.671 RIGHT FOOT PAIN: Primary | ICD-10-CM

## 2021-06-09 DIAGNOSIS — S93.601A SPRAIN OF RIGHT FOOT, INITIAL ENCOUNTER: ICD-10-CM

## 2021-06-09 DIAGNOSIS — S90.31XA CONTUSION OF RIGHT FOOT, INITIAL ENCOUNTER: ICD-10-CM

## 2021-06-09 PROCEDURE — G8417 CALC BMI ABV UP PARAM F/U: HCPCS | Performed by: ORTHOPAEDIC SURGERY

## 2021-06-09 PROCEDURE — 99243 OFF/OP CNSLTJ NEW/EST LOW 30: CPT | Performed by: ORTHOPAEDIC SURGERY

## 2021-06-09 PROCEDURE — G8428 CUR MEDS NOT DOCUMENT: HCPCS | Performed by: ORTHOPAEDIC SURGERY
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
## 2021-07-07 ENCOUNTER — OFFICE VISIT (OUTPATIENT)
Dept: ORTHOPEDIC SURGERY | Age: 46
End: 2021-07-07
Payer: COMMERCIAL

## 2021-07-07 ENCOUNTER — TELEPHONE (OUTPATIENT)
Dept: ORTHOPEDIC SURGERY | Age: 46
End: 2021-07-07

## 2021-07-07 VITALS — WEIGHT: 210 LBS | BODY MASS INDEX: 31.83 KG/M2 | HEIGHT: 68 IN

## 2021-07-07 DIAGNOSIS — S90.31XD CONTUSION OF RIGHT FOOT, SUBSEQUENT ENCOUNTER: Primary | ICD-10-CM

## 2021-07-07 DIAGNOSIS — S62.021A CLOSED DISPLACED FRACTURE OF MIDDLE THIRD OF SCAPHOID BONE OF RIGHT WRIST, INITIAL ENCOUNTER: ICD-10-CM

## 2021-07-07 PROCEDURE — 99214 OFFICE O/P EST MOD 30 MIN: CPT | Performed by: ORTHOPAEDIC SURGERY

## 2021-07-07 PROCEDURE — 4004F PT TOBACCO SCREEN RCVD TLK: CPT | Performed by: ORTHOPAEDIC SURGERY

## 2021-07-07 PROCEDURE — G8417 CALC BMI ABV UP PARAM F/U: HCPCS | Performed by: ORTHOPAEDIC SURGERY

## 2021-07-07 PROCEDURE — G8428 CUR MEDS NOT DOCUMENT: HCPCS | Performed by: ORTHOPAEDIC SURGERY

## 2021-07-07 RX ORDER — BLOOD-GLUCOSE METER
EACH MISCELLANEOUS
COMMUNITY
Start: 2021-06-29
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
## 2021-07-12 ENCOUNTER — TELEPHONE (OUTPATIENT)
Dept: ORTHOPEDIC SURGERY | Age: 46
End: 2021-07-12

## 2021-07-12 NOTE — TELEPHONE ENCOUNTER
FAXED [facility] ( [place] ) 07/07/2021TO Eduin Garcia. @ [address] @ [phone]

## 2021-07-15 ENCOUNTER — TELEPHONE (OUTPATIENT)
Dept: ORTHOPEDIC SURGERY | Age: 46
End: 2021-07-15

## 2021-07-15 NOTE — TELEPHONE ENCOUNTER
[address] MRI RT FOOT  7/9/21- 7/31/2021       Tried to reach pt to notify of MRI approval  Unable to leave message mailbox was full. susana

## 2021-07-21 ENCOUNTER — TELEPHONE (OUTPATIENT)
Dept: ORTHOPEDIC SURGERY | Age: 46
End: 2021-07-21

## 2021-07-21 NOTE — TELEPHONE ENCOUNTER
Other Spoke to patient, he stated that his  needs a [facility] 14 for him for his last appointment. He is also wanting to be scheduled for his MRI with addtional questions he would like to speak to clinic about.  ph. [phone]

## 2021-07-21 NOTE — TELEPHONE ENCOUNTER
Again his mailbox is full unable to reach him. He  was given order with scheduling information on it to call and set up his testing once approved. It was attempted several times to reach the patient his voice mail is full. [employer] completed. From 6/9 and 7/7/21. susana LOPEZ approved for foot MRI see previous telephone encounters.

## 2021-08-04 ENCOUNTER — OFFICE VISIT (OUTPATIENT)
Dept: ORTHOPEDIC SURGERY | Age: 46
End: 2021-08-04
Payer: COMMERCIAL

## 2021-08-04 VITALS — HEIGHT: 68 IN | WEIGHT: 210 LBS | BODY MASS INDEX: 31.83 KG/M2

## 2021-08-04 DIAGNOSIS — M84.374S STRESS FRACTURE OF METATARSAL BONE OF RIGHT FOOT, SEQUELA: ICD-10-CM

## 2021-08-04 DIAGNOSIS — S93.601D SPRAIN OF RIGHT FOOT, SUBSEQUENT ENCOUNTER: ICD-10-CM

## 2021-08-04 DIAGNOSIS — S90.31XD CONTUSION OF RIGHT FOOT, SUBSEQUENT ENCOUNTER: Primary | ICD-10-CM

## 2021-08-04 PROCEDURE — L3260 AMBULATORY SURGICAL BOOT EAC: HCPCS | Performed by: ORTHOPAEDIC SURGERY

## 2021-08-04 PROCEDURE — 99213 OFFICE O/P EST LOW 20 MIN: CPT | Performed by: ORTHOPAEDIC SURGERY
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
## 2021-08-11 ENCOUNTER — TELEPHONE (OUTPATIENT)
Dept: ORTHOPEDIC SURGERY | Age: 46
End: 2021-08-11
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
## 2021-08-11 NOTE — TELEPHONE ENCOUNTER
Patient is here in the office today regarding  not getting correct office info. Ariel Carlson [phone]     [facility] and Tripp Rodriguez    States he needs:   MRI report Rt foot  8/4 OV notes   XRays of wrist from ER   OV notes 7/7 regarding wrist and OV notes  from Ethan villegas referral to hand for Rt  Wrist claim asked for c9 add to claim for scaphoid injury. ..

## 2021-08-25 ENCOUNTER — TELEPHONE (OUTPATIENT)
Dept: ORTHOPEDIC SURGERY | Age: 46
End: 2021-08-25

## 2021-08-25 NOTE — TELEPHONE ENCOUNTER
Received a call from [address] [place] [address]. He needed to cancel his appointment from today due to having car issues. He would like to reschedule at the [place] office. When can he be reschedule? No upcoming appointments with either Dr Hitesh Fowler.

## 2021-08-25 NOTE — TELEPHONE ENCOUNTER
Any chance CBW or Milena Brandon can see patient next week. JBW out next week for AAOS.  Soonest opening is not until 9/15 in [place]

## 2021-08-27 NOTE — TELEPHONE ENCOUNTER
Could this gentleman be worked in next week?  I don't think any other physicians in [place] will see a scaphoid FX

## 2021-09-01 ENCOUNTER — OFFICE VISIT (OUTPATIENT)
Dept: ORTHOPEDIC SURGERY | Age: 46
End: 2021-09-01
Payer: COMMERCIAL

## 2021-09-01 VITALS — HEIGHT: 68 IN | WEIGHT: 210 LBS | BODY MASS INDEX: 31.83 KG/M2

## 2021-09-01 DIAGNOSIS — M84.374S STRESS FRACTURE OF METATARSAL BONE OF RIGHT FOOT, SEQUELA: ICD-10-CM

## 2021-09-01 DIAGNOSIS — S90.31XD CONTUSION OF RIGHT FOOT, SUBSEQUENT ENCOUNTER: Primary | ICD-10-CM

## 2021-09-01 PROCEDURE — 99024 POSTOP FOLLOW-UP VISIT: CPT | Performed by: ORTHOPAEDIC SURGERY
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
## 2021-09-01 NOTE — LETTER
[address] [place] [address] [facility] [address]  Phone: [phone]  Fax: [phone]    Vaishnavi Jeffery MD        September 1, 2021     Patient: Stefania Gonzalez   YOB: 1975   Date of Visit: 9/1/2021       To Whom It May Concern: It is my medical opinion that Leslie Chaudhari may return to work on 09/15/2021 without any restrictions. If you have any questions or concerns, please don't hesitate to call.     Sincerely,      Vaishnavi Jeffery MD

## 2021-09-02 ENCOUNTER — TELEPHONE (OUTPATIENT)
Dept: ORTHOPEDIC SURGERY | Age: 46
End: 2021-09-02

## 2021-09-14 ENCOUNTER — TELEPHONE (OUTPATIENT)
Dept: ORTHOPEDIC SURGERY | Age: 46
End: 2021-09-14

## 2021-09-14 NOTE — TELEPHONE ENCOUNTER
GAVE [facility] ( MONAE NAVARRO ) 05/14/2021 TO PRESENT TO LEONIE CARDENAS TO SCAN IN O TO [address] [place] [address]    E-MAILED [facility]  REQUEST FOR ITEMIZED BILLING

## 2021-10-22 ENCOUNTER — TELEPHONE (OUTPATIENT)
Dept: ORTHOPEDIC SURGERY | Age: 46
End: 2021-10-22

## 2022-03-14 ENCOUNTER — HOSPITAL ENCOUNTER (OUTPATIENT)
Dept: CT IMAGING | Age: 47
Discharge: HOME | End: 2022-03-14
Payer: COMMERCIAL

## 2022-03-14 DIAGNOSIS — S62.009A CLOSED NONDISPLACED FRACTURE OF SCAPHOID, UNSPECIFIED LATERALITY, UNSPECIFIED PORTION OF SCAPHOID, INITIAL ENCOUNTER: ICD-10-CM

## 2022-03-14 PROCEDURE — 73200 CT UPPER EXTREMITY W/O DYE: CPT

## 2022-07-28 ENCOUNTER — HOSPITAL ENCOUNTER (OUTPATIENT)
Dept: ULTRASOUND IMAGING | Age: 47
Discharge: HOME | End: 2022-07-28

## 2022-07-28 DIAGNOSIS — J31.2 CHRONIC SORE THROAT: ICD-10-CM

## 2022-08-02 ENCOUNTER — HOSPITAL ENCOUNTER (OUTPATIENT)
Dept: ULTRASOUND IMAGING | Age: 47
Discharge: HOME | End: 2022-08-02
Payer: MEDICARE

## 2022-08-02 PROCEDURE — 76536 US EXAM OF HEAD AND NECK: CPT

## 2022-08-18 ENCOUNTER — HOSPITAL ENCOUNTER (OUTPATIENT)
Dept: CT IMAGING | Age: 47
Discharge: HOME | End: 2022-08-18
Payer: COMMERCIAL

## 2022-08-18 ENCOUNTER — OFFICE VISIT (OUTPATIENT)
Dept: ENT CLINIC | Age: 47
End: 2022-08-18
Payer: MEDICARE

## 2022-08-18 VITALS
HEIGHT: 68 IN | DIASTOLIC BLOOD PRESSURE: 92 MMHG | SYSTOLIC BLOOD PRESSURE: 155 MMHG | TEMPERATURE: 98 F | BODY MASS INDEX: 31.52 KG/M2 | HEART RATE: 122 BPM | WEIGHT: 208 LBS

## 2022-08-18 DIAGNOSIS — R49.0 DYSPHONIA: ICD-10-CM

## 2022-08-18 DIAGNOSIS — S62.009A: ICD-10-CM

## 2022-08-18 DIAGNOSIS — K21.9 GASTROESOPHAGEAL REFLUX DISEASE, UNSPECIFIED WHETHER ESOPHAGITIS PRESENT: Primary | ICD-10-CM

## 2022-08-18 DIAGNOSIS — J38.3 LEUKOPLAKIA OF VOCAL CORDS: ICD-10-CM

## 2022-08-18 DIAGNOSIS — R07.0 THROAT PAIN: ICD-10-CM

## 2022-08-18 PROCEDURE — G8427 DOCREV CUR MEDS BY ELIG CLIN: HCPCS | Performed by: OTOLARYNGOLOGY

## 2022-08-18 PROCEDURE — 31575 DIAGNOSTIC LARYNGOSCOPY: CPT | Performed by: OTOLARYNGOLOGY

## 2022-08-18 PROCEDURE — 99203 OFFICE O/P NEW LOW 30 MIN: CPT | Performed by: OTOLARYNGOLOGY

## 2022-08-18 PROCEDURE — 4004F PT TOBACCO SCREEN RCVD TLK: CPT | Performed by: OTOLARYNGOLOGY

## 2022-08-18 PROCEDURE — G8417 CALC BMI ABV UP PARAM F/U: HCPCS | Performed by: OTOLARYNGOLOGY

## 2022-08-18 PROCEDURE — 73200 CT UPPER EXTREMITY W/O DYE: CPT

## 2022-08-18 ASSESSMENT — ENCOUNTER SYMPTOMS
SORE THROAT: 1
FACIAL SWELLING: 0
VOICE CHANGE: 0
SHORTNESS OF BREATH: 0
TROUBLE SWALLOWING: 0
SINUS PRESSURE: 0
COUGH: 0
EYE ITCHING: 0
APNEA: 0

## 2022-08-18 NOTE — PROGRESS NOTES
[facility], [address], [address], [facility] Nasir Rodrigues  P: [phone]       Patient     Elena Job  1975    ChiefComplaint     Chief Complaint   Patient presents with    Pharyngitis     Patient is here today for sore throat. Patient states his throat has been hurting for 3 months. He states it is mostly when laying down at night and sleeping. He states it gets dry and sore. He has seen his PCP for it and can not find anything. Patient states when he is sleeping it is hard to swallow. His voice also changes, and will get a nasty taste in his mouth. History of Present Illness     Chantal Fay is a 80-year-old male here today for evaluation of sore throat and hoarse voice. Symptoms have been ongoing for greater than 3 months. Throat will feel fine during the day but approximately 2 hours after laying down at night he will develop significant sore throat on the left side as well as dryness and foul taste in his mouth. No history of acid reflux which she takes Nexium once a day in the morning. Drinks alcohol prior to bed, bloody Elliott Cord, and typically eats within 2 to 3 hours. Also reports his vocal quality has changed. Current daily drinker and daily smoker.     Past Medical History     Past Medical History:   Diagnosis Date    Anxiety     Behavior problem     due to pain    Bipolar 1 disorder ([facility])     Diabetes mellitus ([facility] [address]     Drug tolerance     opiod    Facet arthropathy     Fracture of cervical vertebra, C7 ([facility])     GERD (gastroesophageal reflux disease)     Hyperlipemia     Hypertension     Impingement syndrome of left shoulder     Mood disorder ([facility])     Myofascial pain     Pains, face     Spondylolysis     Substance abuse/dependence     methadone       Past Surgical History     Past Surgical History:   Procedure Laterality Date    HERNIA REPAIR      testicle    WRIST FRACTURE SURGERY         Family History     Family History   Problem Relation Age of Onset    Stroke Mother Heart Attack Father        Social History     Social History     Tobacco Use    Smoking status: Every Day     Packs/day: 1.00     Years: 30.00     Pack years: 30.00     Types: Cigarettes    Smokeless tobacco: Never   Vaping Use    Vaping Use: Never used   Substance Use Topics    Alcohol use: Yes     Alcohol/week: 10.0 standard drinks     Types: 5 Glasses of wine, 5 Shots of liquor per week     Comment: drinks on weekends     Drug use: No        Allergies     Allergies   Allergen Reactions    Tramadol Rash, Hives and Swelling       Medications     Current Outpatient Medications   Medication Sig Dispense Refill    Blood Glucose Monitoring Suppl (ONETOUCH VERIO FLEX SYSTEM) w/Device KIT       Lancet Devices (EASY MINI EJECT LANCING DEVICE) MISC       esomeprazole (NEXIUM) 20 MG delayed release capsule       lisinopril (PRINIVIL;ZESTRIL) 20 MG tablet       albuterol sulfate  (90 Base) MCG/ACT inhaler       LOSARTAN POTASSIUM PO Take by mouth      ibuprofen (ADVIL;MOTRIN) 800 MG tablet Take 1 tablet by mouth every 6 hours as needed for Pain 20 tablet 0    insulin glargine (BASAGLAR KWIKPEN) 100 UNIT/ML injection pen Inject 18 Units into the skin daily 5 pen 3    Insulin Pen Needle (PEN NEEDLES 3/16\") 31G X 5 MM MISC 1 each by Does not apply route daily 100 each 3    blood glucose test strips (ASCENSIA AUTODISC VI;ONE TOUCH ULTRA TEST VI) strip 1 each by In Vitro route daily As needed. 100 each 3    SOFT TOUCH LANCETS MISC Use once daily 100 each 3    diphenhydrAMINE-APAP, sleep, (TYLENOL PM EXTRA STRENGTH)  MG tablet Take 1 tablet by mouth nightly as needed for Sleep      meloxicam (MOBIC) 15 MG tablet  (Patient not taking: Reported on 8/18/2022)      cyclobenzaprine (FLEXERIL) 10 MG tablet  (Patient not taking: Reported on 8/18/2022)       No current facility-administered medications for this visit.        Review of Systems     Review of Systems   Constitutional:  Negative for appetite change, chills, fatigue, fever and unexpected weight change. HENT:  Positive for sore throat. Negative for congestion, ear discharge, ear pain, facial swelling, hearing loss, nosebleeds, postnasal drip, sinus pressure, sneezing, tinnitus, trouble swallowing and voice change. Eyes:  Negative for itching. Respiratory:  Negative for apnea, cough and shortness of breath. Endocrine: Negative for cold intolerance and heat intolerance. Musculoskeletal:  Negative for myalgias and neck pain. Skin:  Negative for rash. Allergic/Immunologic: Negative for environmental allergies. Neurological:  Negative for dizziness and headaches. Psychiatric/Behavioral:  Negative for confusion, decreased concentration and sleep disturbance. PhysicalExam     Vitals:    08/18/22 1041   BP: (!) 155/92   Site: Right Upper Arm   Position: Sitting   Pulse: (!) 122   Temp: 98 °F (36.7 °C)   TempSrc: Infrared   Weight: 208 lb (94.3 kg)   Height: 5' 8\" (1.727 m)       Physical Exam  Constitutional:       General: He is not in acute distress. Appearance: He is well-developed. HENT:      Head: Normocephalic and atraumatic. Right Ear: Tympanic membrane, ear canal and external ear normal. No drainage. No middle ear effusion. Tympanic membrane is not bulging. Tympanic membrane has normal mobility. Left Ear: Tympanic membrane, ear canal and external ear normal. No drainage. No middle ear effusion. Tympanic membrane is not bulging. Tympanic membrane has normal mobility. Nose: No mucosal edema or rhinorrhea. Mouth/Throat:      Lips: Pink. Mouth: Mucous membranes are moist.      Tongue: No lesions. Palate: No mass. Pharynx: Uvula midline. Eyes:      Pupils: Pupils are equal, round, and reactive to light. Neck:      Thyroid: No thyroid mass or thyromegaly. Trachea: Trachea and phonation normal.   Cardiovascular:      Pulses: Normal pulses.    Pulmonary:      Effort: Pulmonary effort is normal. No accessory muscle usage or respiratory distress. Breath sounds: No stridor. Musculoskeletal:      Cervical back: Full passive range of motion without pain. Lymphadenopathy:      Head:      Right side of head: No submental or submandibular adenopathy. Left side of head: No submental or submandibular adenopathy. Cervical: No cervical adenopathy. Right cervical: No superficial, deep or posterior cervical adenopathy. Left cervical: No superficial, deep or posterior cervical adenopathy. Skin:     General: Skin is warm and dry. Neurological:      Mental Status: He is alert and oriented to person, place, and time. Cranial Nerves: No cranial nerve deficit. Coordination: Coordination normal.      Gait: Gait normal.   Psychiatric:         Thought Content: Thought content normal.         Procedure     Flexible Laryngoscopy    Pre op: throat pain, dysphonia  Post op: leukoplakia bilateral TVC, GERD  Procedure : Flexible Laryngoscopy  Surgeon: Joanne Arreguin DO  Anesthesia: Afrin with 4% lidocaine  Indication: 68-year-old male with daily throat pain and hoarse voice laryngeal mirror examination was not tolerated due to gag reflex  Description:  The scope was passed along the floor of the right naris to the level of the larynx. The base of tongue, vallecula, epiglottis, aryepiglottic folds, arytenoids, false vocal folds, true vocal folds, or pyriform sinuses were all evaluated. True vocal folds exhibited symmetric motion bilaterally without evidence of paralysis or paresis. The scope was removed. The patient tolerated the procedure without difficulty. There were no complications. Pertinent findings: Leukoplakia bilateral true vocal cords, post cricoid thickening and erythema consistent with reflux related changes, no evidence of mass or lesion      Assessment and Plan     1. Gastroesophageal reflux disease, unspecified whether esophagitis present    2. Throat pain    3.  Leukoplakia of vocal

## 2022-09-17 ENCOUNTER — APPOINTMENT (OUTPATIENT)
Dept: GENERAL RADIOLOGY | Age: 47
DRG: 282 | End: 2022-09-17
Payer: MEDICARE

## 2022-09-17 ENCOUNTER — HOSPITAL ENCOUNTER (INPATIENT)
Age: 47
LOS: 4 days | Discharge: HOME | DRG: 282 | End: 2022-09-21
Attending: EMERGENCY MEDICINE | Admitting: INTERNAL MEDICINE
Payer: MEDICARE

## 2022-09-17 ENCOUNTER — APPOINTMENT (OUTPATIENT)
Dept: CT IMAGING | Age: 47
DRG: 282 | End: 2022-09-17
Payer: MEDICARE

## 2022-09-17 DIAGNOSIS — E08.10 DIABETIC KETOACIDOSIS WITHOUT COMA ASSOCIATED WITH DIABETES MELLITUS DUE TO UNDERLYING CONDITION (HCC): Primary | ICD-10-CM

## 2022-09-17 DIAGNOSIS — K85.20 ALCOHOL-INDUCED ACUTE PANCREATITIS WITHOUT INFECTION OR NECROSIS: ICD-10-CM

## 2022-09-17 LAB
A/G RATIO: 1.2 (ref 1.1–2.2)
ALBUMIN SERPL-MCNC: 4.4 G/DL (ref 3.4–5)
ALP BLD-CCNC: 123 U/L (ref 40–129)
ALT SERPL-CCNC: 91 U/L (ref 10–40)
ANION GAP SERPL CALCULATED.3IONS-SCNC: 16 MMOL/L (ref 3–16)
ANION GAP SERPL CALCULATED.3IONS-SCNC: 22 MMOL/L (ref 3–16)
AST SERPL-CCNC: 40 U/L (ref 15–37)
BACTERIA: ABNORMAL /HPF
BANDED NEUTROPHILS RELATIVE PERCENT: 3 % (ref 0–7)
BASE EXCESS VENOUS: -4.4 MMOL/L (ref -3–3)
BASOPHILS ABSOLUTE: 0 K/UL (ref 0–0.2)
BASOPHILS RELATIVE PERCENT: 0 %
BETA-HYDROXYBUTYRATE: 0.5 MMOL/L (ref 0–0.27)
BILIRUB SERPL-MCNC: 1.3 MG/DL (ref 0–1)
BILIRUBIN URINE: NEGATIVE
BLOOD, URINE: ABNORMAL
BUN BLDV-MCNC: 11 MG/DL (ref 7–20)
BUN BLDV-MCNC: 9 MG/DL (ref 7–20)
CALCIUM SERPL-MCNC: 9.2 MG/DL (ref 8.3–10.6)
CALCIUM SERPL-MCNC: 9.8 MG/DL (ref 8.3–10.6)
CARBOXYHEMOGLOBIN: 6.6 % (ref 0–1.5)
CHLORIDE BLD-SCNC: 81 MMOL/L (ref 99–110)
CHLORIDE BLD-SCNC: 90 MMOL/L (ref 99–110)
CLARITY: CLEAR
CO2: 19 MMOL/L (ref 21–32)
CO2: 23 MMOL/L (ref 21–32)
COLOR: YELLOW
CREAT SERPL-MCNC: 0.7 MG/DL (ref 0.9–1.3)
CREAT SERPL-MCNC: 0.8 MG/DL (ref 0.9–1.3)
EOSINOPHILS ABSOLUTE: 0 K/UL (ref 0–0.6)
EOSINOPHILS RELATIVE PERCENT: 0 %
EPITHELIAL CELLS, UA: ABNORMAL /HPF (ref 0–5)
ETHANOL: 133 MG/DL (ref 0–0.08)
GFR AFRICAN AMERICAN: >60
GFR AFRICAN AMERICAN: >60
GFR NON-AFRICAN AMERICAN: >60
GFR NON-AFRICAN AMERICAN: >60
GLUCOSE BLD-MCNC: 251 MG/DL (ref 70–99)
GLUCOSE BLD-MCNC: 256 MG/DL (ref 70–99)
GLUCOSE BLD-MCNC: 265 MG/DL (ref 70–99)
GLUCOSE BLD-MCNC: 289 MG/DL (ref 70–99)
GLUCOSE BLD-MCNC: 341 MG/DL (ref 70–99)
GLUCOSE BLD-MCNC: 656 MG/DL (ref 70–99)
GLUCOSE BLD-MCNC: >600 MG/DL (ref 70–99)
GLUCOSE URINE: >=1000 MG/DL
HCO3 VENOUS: 19.7 MMOL/L (ref 23–29)
HCT VFR BLD CALC: 46.9 % (ref 40.5–52.5)
HEMOGLOBIN: 15.9 G/DL (ref 13.5–17.5)
KETONES, URINE: NEGATIVE MG/DL
LACTIC ACID, SEPSIS: 3.7 MMOL/L (ref 0.4–1.9)
LACTIC ACID, SEPSIS: 5.5 MMOL/L (ref 0.4–1.9)
LACTIC ACID: 3.5 MMOL/L (ref 0.4–2)
LEUKOCYTE ESTERASE, URINE: NEGATIVE
LIPASE: 2917 U/L (ref 13–60)
LYMPHOCYTES ABSOLUTE: 0.8 K/UL (ref 1–5.1)
LYMPHOCYTES RELATIVE PERCENT: 9 %
MAGNESIUM: 1.9 MG/DL (ref 1.8–2.4)
MAGNESIUM: 2 MG/DL (ref 1.8–2.4)
MCH RBC QN AUTO: 33.8 PG (ref 26–34)
MCHC RBC AUTO-ENTMCNC: 34 G/DL (ref 31–36)
MCV RBC AUTO: 99.4 FL (ref 80–100)
METHEMOGLOBIN VENOUS: 0.3 %
MICROSCOPIC EXAMINATION: YES
MONOCYTES ABSOLUTE: 0.9 K/UL (ref 0–1.3)
MONOCYTES RELATIVE PERCENT: 10 %
NEUTROPHILS ABSOLUTE: 7 K/UL (ref 1.7–7.7)
NEUTROPHILS RELATIVE PERCENT: 78 %
NITRITE, URINE: NEGATIVE
O2 CONTENT, VEN: 20 VOL %
O2 SAT, VEN: 91 %
O2 THERAPY: ABNORMAL
PCO2, VEN: 34.1 MMHG (ref 40–50)
PDW BLD-RTO: 14.5 % (ref 12.4–15.4)
PERFORMED ON: ABNORMAL
PH UA: 6 (ref 5–8)
PH VENOUS: 7.38 (ref 7.35–7.45)
PHOSPHORUS: 3.5 MG/DL (ref 2.5–4.9)
PHOSPHORUS: 4.7 MG/DL (ref 2.5–4.9)
PLATELET # BLD: 166 K/UL (ref 135–450)
PLATELET SLIDE REVIEW: ADEQUATE
PMV BLD AUTO: 6.8 FL (ref 5–10.5)
PO2, VEN: 61 MMHG (ref 25–40)
POTASSIUM REFLEX MAGNESIUM: 3.9 MMOL/L (ref 3.5–5.1)
POTASSIUM REFLEX MAGNESIUM: 4.2 MMOL/L (ref 3.5–5.1)
PROTEIN UA: NEGATIVE MG/DL
RBC # BLD: 4.71 M/UL (ref 4.2–5.9)
RBC UA: ABNORMAL /HPF (ref 0–4)
SARS-COV-2, NAAT: NOT DETECTED
SLIDE REVIEW: ABNORMAL
SODIUM BLD-SCNC: 122 MMOL/L (ref 136–145)
SODIUM BLD-SCNC: 129 MMOL/L (ref 136–145)
SPECIFIC GRAVITY UA: <=1.005 (ref 1–1.03)
TCO2 CALC VENOUS: 21 MMOL/L
TOTAL PROTEIN: 8 G/DL (ref 6.4–8.2)
TROPONIN: <0.01 NG/ML
URINE REFLEX TO CULTURE: ABNORMAL
URINE TYPE: ABNORMAL
UROBILINOGEN, URINE: 0.2 E.U./DL
WBC # BLD: 8.6 K/UL (ref 4–11)
WBC UA: ABNORMAL /HPF (ref 0–5)

## 2022-09-17 PROCEDURE — 87635 SARS-COV-2 COVID-19 AMP PRB: CPT

## 2022-09-17 PROCEDURE — 6370000000 HC RX 637 (ALT 250 FOR IP): Performed by: INTERNAL MEDICINE

## 2022-09-17 PROCEDURE — 82803 BLOOD GASES ANY COMBINATION: CPT

## 2022-09-17 PROCEDURE — 83605 ASSAY OF LACTIC ACID: CPT

## 2022-09-17 PROCEDURE — 74177 CT ABD & PELVIS W/CONTRAST: CPT

## 2022-09-17 PROCEDURE — 82010 KETONE BODYS QUAN: CPT

## 2022-09-17 PROCEDURE — 84478 ASSAY OF TRIGLYCERIDES: CPT

## 2022-09-17 PROCEDURE — 84484 ASSAY OF TROPONIN QUANT: CPT

## 2022-09-17 PROCEDURE — 99285 EMERGENCY DEPT VISIT HI MDM: CPT

## 2022-09-17 PROCEDURE — 6360000002 HC RX W HCPCS: Performed by: EMERGENCY MEDICINE

## 2022-09-17 PROCEDURE — 6360000002 HC RX W HCPCS: Performed by: INTERNAL MEDICINE

## 2022-09-17 PROCEDURE — 83690 ASSAY OF LIPASE: CPT

## 2022-09-17 PROCEDURE — 83735 ASSAY OF MAGNESIUM: CPT

## 2022-09-17 PROCEDURE — 2580000003 HC RX 258: Performed by: INTERNAL MEDICINE

## 2022-09-17 PROCEDURE — 82077 ASSAY SPEC XCP UR&BREATH IA: CPT

## 2022-09-17 PROCEDURE — 36415 COLL VENOUS BLD VENIPUNCTURE: CPT

## 2022-09-17 PROCEDURE — 85025 COMPLETE CBC W/AUTO DIFF WBC: CPT

## 2022-09-17 PROCEDURE — 84100 ASSAY OF PHOSPHORUS: CPT

## 2022-09-17 PROCEDURE — 81001 URINALYSIS AUTO W/SCOPE: CPT

## 2022-09-17 PROCEDURE — 94761 N-INVAS EAR/PLS OXIMETRY MLT: CPT

## 2022-09-17 PROCEDURE — 2500000003 HC RX 250 WO HCPCS: Performed by: INTERNAL MEDICINE

## 2022-09-17 PROCEDURE — 80053 COMPREHEN METABOLIC PANEL: CPT

## 2022-09-17 PROCEDURE — 6370000000 HC RX 637 (ALT 250 FOR IP)

## 2022-09-17 PROCEDURE — 2000000000 HC ICU R&B

## 2022-09-17 PROCEDURE — 6370000000 HC RX 637 (ALT 250 FOR IP): Performed by: EMERGENCY MEDICINE

## 2022-09-17 PROCEDURE — 71045 X-RAY EXAM CHEST 1 VIEW: CPT

## 2022-09-17 PROCEDURE — 93005 ELECTROCARDIOGRAM TRACING: CPT | Performed by: EMERGENCY MEDICINE

## 2022-09-17 PROCEDURE — 6360000004 HC RX CONTRAST MEDICATION: Performed by: EMERGENCY MEDICINE

## 2022-09-17 PROCEDURE — 80307 DRUG TEST PRSMV CHEM ANLYZR: CPT

## 2022-09-17 PROCEDURE — 2580000003 HC RX 258: Performed by: EMERGENCY MEDICINE

## 2022-09-17 PROCEDURE — 2700000000 HC OXYGEN THERAPY PER DAY

## 2022-09-17 RX ORDER — ENOXAPARIN SODIUM 100 MG/ML
40 INJECTION SUBCUTANEOUS DAILY
Status: DISCONTINUED | OUTPATIENT
Start: 2022-09-18 | End: 2022-09-21 | Stop reason: HOSPADM

## 2022-09-17 RX ORDER — DEXTROSE AND SODIUM CHLORIDE 5; .45 G/100ML; G/100ML
INJECTION, SOLUTION INTRAVENOUS CONTINUOUS PRN
Status: DISCONTINUED | OUTPATIENT
Start: 2022-09-17 | End: 2022-09-18

## 2022-09-17 RX ORDER — SODIUM CHLORIDE, SODIUM LACTATE, POTASSIUM CHLORIDE, CALCIUM CHLORIDE 600; 310; 30; 20 MG/100ML; MG/100ML; MG/100ML; MG/100ML
INJECTION, SOLUTION INTRAVENOUS CONTINUOUS
Status: DISCONTINUED | OUTPATIENT
Start: 2022-09-17 | End: 2022-09-18 | Stop reason: SDUPTHER

## 2022-09-17 RX ORDER — CHLORDIAZEPOXIDE HYDROCHLORIDE 5 MG/1
CAPSULE, GELATIN COATED ORAL
Status: COMPLETED
Start: 2022-09-17 | End: 2022-09-17

## 2022-09-17 RX ORDER — POTASSIUM CHLORIDE 7.45 MG/ML
10 INJECTION INTRAVENOUS ONCE
Status: COMPLETED | OUTPATIENT
Start: 2022-09-17 | End: 2022-09-17

## 2022-09-17 RX ORDER — MORPHINE SULFATE 4 MG/ML
4 INJECTION, SOLUTION INTRAMUSCULAR; INTRAVENOUS ONCE
Status: COMPLETED | OUTPATIENT
Start: 2022-09-17 | End: 2022-09-17

## 2022-09-17 RX ORDER — CHLORDIAZEPOXIDE HYDROCHLORIDE 5 MG/1
5 CAPSULE, GELATIN COATED ORAL EVERY 6 HOURS PRN
Status: DISCONTINUED | OUTPATIENT
Start: 2022-09-17 | End: 2022-09-17

## 2022-09-17 RX ORDER — SODIUM CHLORIDE 9 MG/ML
INJECTION, SOLUTION INTRAVENOUS CONTINUOUS
Status: DISCONTINUED | OUTPATIENT
Start: 2022-09-17 | End: 2022-09-17

## 2022-09-17 RX ORDER — LISINOPRIL 20 MG/1
20 TABLET ORAL DAILY
Status: DISCONTINUED | OUTPATIENT
Start: 2022-09-17 | End: 2022-09-21 | Stop reason: HOSPADM

## 2022-09-17 RX ORDER — M-VIT,TX,IRON,MINS/CALC/FOLIC 27MG-0.4MG
1 TABLET ORAL DAILY
Status: DISCONTINUED | OUTPATIENT
Start: 2022-09-18 | End: 2022-09-18

## 2022-09-17 RX ORDER — 0.9 % SODIUM CHLORIDE 0.9 %
1000 INTRAVENOUS SOLUTION INTRAVENOUS ONCE
Status: COMPLETED | OUTPATIENT
Start: 2022-09-17 | End: 2022-09-17

## 2022-09-17 RX ORDER — SODIUM CHLORIDE 450 MG/100ML
INJECTION, SOLUTION INTRAVENOUS CONTINUOUS
Status: DISCONTINUED | OUTPATIENT
Start: 2022-09-17 | End: 2022-09-17

## 2022-09-17 RX ORDER — SODIUM CHLORIDE 9 MG/ML
INJECTION, SOLUTION INTRAVENOUS PRN
Status: DISCONTINUED | OUTPATIENT
Start: 2022-09-17 | End: 2022-09-17

## 2022-09-17 RX ORDER — LORAZEPAM 2 MG/1
2 TABLET ORAL
Status: DISCONTINUED | OUTPATIENT
Start: 2022-09-17 | End: 2022-09-21

## 2022-09-17 RX ORDER — NICOTINE 21 MG/24HR
PATCH, TRANSDERMAL 24 HOURS TRANSDERMAL
Status: DISCONTINUED
Start: 2022-09-17 | End: 2022-09-17

## 2022-09-17 RX ORDER — DIAZEPAM 5 MG/ML
5 INJECTION, SOLUTION INTRAMUSCULAR; INTRAVENOUS EVERY 4 HOURS PRN
Status: DISCONTINUED | OUTPATIENT
Start: 2022-09-17 | End: 2022-09-17

## 2022-09-17 RX ORDER — SODIUM CHLORIDE 0.9 % (FLUSH) 0.9 %
5-40 SYRINGE (ML) INJECTION EVERY 12 HOURS SCHEDULED
Status: DISCONTINUED | OUTPATIENT
Start: 2022-09-17 | End: 2022-09-17

## 2022-09-17 RX ORDER — LORAZEPAM 2 MG/ML
2 INJECTION INTRAMUSCULAR
Status: DISCONTINUED | OUTPATIENT
Start: 2022-09-17 | End: 2022-09-21

## 2022-09-17 RX ORDER — INSULIN GLARGINE 100 [IU]/ML
9 INJECTION, SOLUTION SUBCUTANEOUS NIGHTLY
Status: DISCONTINUED | OUTPATIENT
Start: 2022-09-17 | End: 2022-09-21 | Stop reason: HOSPADM

## 2022-09-17 RX ORDER — SODIUM CHLORIDE 0.9 % (FLUSH) 0.9 %
5-40 SYRINGE (ML) INJECTION PRN
Status: DISCONTINUED | OUTPATIENT
Start: 2022-09-17 | End: 2022-09-17

## 2022-09-17 RX ORDER — LABETALOL HYDROCHLORIDE 5 MG/ML
10 INJECTION, SOLUTION INTRAVENOUS EVERY 6 HOURS PRN
Status: DISCONTINUED | OUTPATIENT
Start: 2022-09-17 | End: 2022-09-18

## 2022-09-17 RX ORDER — INSULIN LISPRO 100 [IU]/ML
0-8 INJECTION, SOLUTION INTRAVENOUS; SUBCUTANEOUS
Status: DISCONTINUED | OUTPATIENT
Start: 2022-09-18 | End: 2022-09-21 | Stop reason: HOSPADM

## 2022-09-17 RX ORDER — DEXTROSE, SODIUM CHLORIDE, AND POTASSIUM CHLORIDE 5; .45; .15 G/100ML; G/100ML; G/100ML
INJECTION INTRAVENOUS CONTINUOUS PRN
Status: DISCONTINUED | OUTPATIENT
Start: 2022-09-17 | End: 2022-09-18

## 2022-09-17 RX ORDER — LORAZEPAM 2 MG/ML
3 INJECTION INTRAMUSCULAR
Status: DISCONTINUED | OUTPATIENT
Start: 2022-09-17 | End: 2022-09-21

## 2022-09-17 RX ORDER — ONDANSETRON 2 MG/ML
4 INJECTION INTRAMUSCULAR; INTRAVENOUS ONCE
Status: COMPLETED | OUTPATIENT
Start: 2022-09-17 | End: 2022-09-17

## 2022-09-17 RX ORDER — PROCHLORPERAZINE EDISYLATE 5 MG/ML
10 INJECTION INTRAMUSCULAR; INTRAVENOUS EVERY 6 HOURS PRN
Status: DISCONTINUED | OUTPATIENT
Start: 2022-09-17 | End: 2022-09-21 | Stop reason: HOSPADM

## 2022-09-17 RX ORDER — LORAZEPAM 1 MG/1
1 TABLET ORAL
Status: DISCONTINUED | OUTPATIENT
Start: 2022-09-17 | End: 2022-09-21

## 2022-09-17 RX ORDER — ALBUTEROL SULFATE 90 UG/1
2 AEROSOL, METERED RESPIRATORY (INHALATION) EVERY 6 HOURS PRN
Status: DISCONTINUED | OUTPATIENT
Start: 2022-09-17 | End: 2022-09-21 | Stop reason: HOSPADM

## 2022-09-17 RX ORDER — 0.9 % SODIUM CHLORIDE 0.9 %
15 INTRAVENOUS SOLUTION INTRAVENOUS ONCE
Status: DISCONTINUED | OUTPATIENT
Start: 2022-09-17 | End: 2022-09-17

## 2022-09-17 RX ORDER — FOLIC ACID 1 MG/1
1 TABLET ORAL DAILY
Status: DISCONTINUED | OUTPATIENT
Start: 2022-09-18 | End: 2022-09-18

## 2022-09-17 RX ORDER — LORAZEPAM 2 MG/ML
1 INJECTION INTRAMUSCULAR
Status: DISCONTINUED | OUTPATIENT
Start: 2022-09-17 | End: 2022-09-21

## 2022-09-17 RX ORDER — PANTOPRAZOLE SODIUM 40 MG/10ML
40 INJECTION, POWDER, LYOPHILIZED, FOR SOLUTION INTRAVENOUS DAILY
Status: DISCONTINUED | OUTPATIENT
Start: 2022-09-18 | End: 2022-09-21

## 2022-09-17 RX ORDER — POTASSIUM CHLORIDE 7.45 MG/ML
10 INJECTION INTRAVENOUS PRN
Status: DISCONTINUED | OUTPATIENT
Start: 2022-09-17 | End: 2022-09-18

## 2022-09-17 RX ORDER — NICOTINE 21 MG/24HR
1 PATCH, TRANSDERMAL 24 HOURS TRANSDERMAL DAILY
Status: DISCONTINUED | OUTPATIENT
Start: 2022-09-17 | End: 2022-09-21 | Stop reason: HOSPADM

## 2022-09-17 RX ORDER — MAGNESIUM SULFATE 1 G/100ML
1000 INJECTION INTRAVENOUS PRN
Status: DISCONTINUED | OUTPATIENT
Start: 2022-09-17 | End: 2022-09-21 | Stop reason: HOSPADM

## 2022-09-17 RX ORDER — LORAZEPAM 2 MG/1
4 TABLET ORAL
Status: DISCONTINUED | OUTPATIENT
Start: 2022-09-17 | End: 2022-09-21

## 2022-09-17 RX ORDER — CHLORDIAZEPOXIDE HYDROCHLORIDE 10 MG/1
10 CAPSULE, GELATIN COATED ORAL EVERY 6 HOURS
Status: DISCONTINUED | OUTPATIENT
Start: 2022-09-17 | End: 2022-09-21 | Stop reason: HOSPADM

## 2022-09-17 RX ORDER — LORAZEPAM 2 MG/ML
4 INJECTION INTRAMUSCULAR
Status: DISCONTINUED | OUTPATIENT
Start: 2022-09-17 | End: 2022-09-21

## 2022-09-17 RX ORDER — LANOLIN ALCOHOL/MO/W.PET/CERES
100 CREAM (GRAM) TOPICAL DAILY
Status: DISCONTINUED | OUTPATIENT
Start: 2022-09-18 | End: 2022-09-18

## 2022-09-17 RX ADMIN — IOPAMIDOL 75 ML: 755 INJECTION, SOLUTION INTRAVENOUS at 18:01

## 2022-09-17 RX ADMIN — INSULIN GLARGINE 9 UNITS: 100 INJECTION, SOLUTION SUBCUTANEOUS at 23:40

## 2022-09-17 RX ADMIN — ONDANSETRON HYDROCHLORIDE 4 MG: 2 INJECTION, SOLUTION INTRAMUSCULAR; INTRAVENOUS at 17:43

## 2022-09-17 RX ADMIN — CHLORDIAZEPOXIDE HYDROCHLORIDE 5 MG: 5 CAPSULE ORAL at 20:42

## 2022-09-17 RX ADMIN — Medication 2 PUFF: at 23:22

## 2022-09-17 RX ADMIN — INSULIN LISPRO 4 UNITS: 100 INJECTION, SOLUTION INTRAVENOUS; SUBCUTANEOUS at 23:40

## 2022-09-17 RX ADMIN — SODIUM CHLORIDE 1000 ML: 9 INJECTION, SOLUTION INTRAVENOUS at 18:08

## 2022-09-17 RX ADMIN — SODIUM CHLORIDE 1000 ML: 9 INJECTION, SOLUTION INTRAVENOUS at 17:42

## 2022-09-17 RX ADMIN — CHLORDIAZEPOXIDE HYDROCHLORIDE 5 MG: 5 CAPSULE, GELATIN COATED ORAL at 20:42

## 2022-09-17 RX ADMIN — LISINOPRIL 20 MG: 20 TABLET ORAL at 23:38

## 2022-09-17 RX ADMIN — MORPHINE SULFATE 4 MG: 4 INJECTION, SOLUTION INTRAMUSCULAR; INTRAVENOUS at 17:43

## 2022-09-17 RX ADMIN — SODIUM CHLORIDE 0.1 UNITS/KG/HR: 9 INJECTION, SOLUTION INTRAVENOUS at 18:14

## 2022-09-17 RX ADMIN — THIAMINE HYDROCHLORIDE: 100 INJECTION, SOLUTION INTRAMUSCULAR; INTRAVENOUS at 19:46

## 2022-09-17 RX ADMIN — POTASSIUM CHLORIDE 10 MEQ: 7.46 INJECTION, SOLUTION INTRAVENOUS at 20:27

## 2022-09-17 RX ADMIN — LABETALOL HYDROCHLORIDE 10 MG: 5 INJECTION, SOLUTION INTRAVENOUS at 22:47

## 2022-09-17 RX ADMIN — HYDROMORPHONE HYDROCHLORIDE 1 MG: 1 INJECTION, SOLUTION INTRAMUSCULAR; INTRAVENOUS; SUBCUTANEOUS at 19:41

## 2022-09-17 RX ADMIN — SODIUM CHLORIDE, POTASSIUM CHLORIDE, SODIUM LACTATE AND CALCIUM CHLORIDE: 600; 310; 30; 20 INJECTION, SOLUTION INTRAVENOUS at 23:48

## 2022-09-17 RX ADMIN — MORPHINE SULFATE 4 MG: 4 INJECTION, SOLUTION INTRAMUSCULAR; INTRAVENOUS at 18:53

## 2022-09-17 ASSESSMENT — PAIN SCALES - GENERAL
PAINLEVEL_OUTOF10: 6
PAINLEVEL_OUTOF10: 6
PAINLEVEL_OUTOF10: 7
PAINLEVEL_OUTOF10: 0
PAINLEVEL_OUTOF10: 7

## 2022-09-17 ASSESSMENT — PAIN - FUNCTIONAL ASSESSMENT
PAIN_FUNCTIONAL_ASSESSMENT: 0-10
PAIN_FUNCTIONAL_ASSESSMENT: NONE - DENIES PAIN

## 2022-09-17 ASSESSMENT — LIFESTYLE VARIABLES
HOW MANY STANDARD DRINKS CONTAINING ALCOHOL DO YOU HAVE ON A TYPICAL DAY: 7 TO 9
HOW OFTEN DO YOU HAVE A DRINK CONTAINING ALCOHOL: 4 OR MORE TIMES A WEEK

## 2022-09-17 ASSESSMENT — PAIN DESCRIPTION - FREQUENCY: FREQUENCY: CONTINUOUS

## 2022-09-17 ASSESSMENT — PAIN DESCRIPTION - DESCRIPTORS: DESCRIPTORS: CRAMPING

## 2022-09-17 ASSESSMENT — PAIN DESCRIPTION - LOCATION: LOCATION: ABDOMEN

## 2022-09-17 ASSESSMENT — PAIN DESCRIPTION - ORIENTATION: ORIENTATION: RIGHT;LEFT;LOWER;MID;UPPER

## 2022-09-17 NOTE — ED NOTES
Kim Lazo at the transfer center is paging the intensivist at [facility].       Susana   09/17/22 1841

## 2022-09-17 NOTE — LETTER
[phone] [id number]  Phone: [phone]    No name on file. September 21, 2022     Patient: Ruben Godoy   YOB: 1975   Date of Visit: 9/17/2022       To Whom It May Concern: It is my medical opinion that Taniya Calloway was admitted to [facility] on Sept 17, 2022 and discharge on September 21, 2022. If you have any questions or concerns, please don't hesitate to call.     Sincerely,        Zeinab Pulido

## 2022-09-17 NOTE — H&P
Hospital Medicine History & Physical      PCP: Geovani Lambert, APRN - CNP    Date of Service: Pt seen/examined on 9/17/22 and admitted on 9/17/22 to Inpatient. Chief Complaint   Patient presents with    Abdominal Pain     C/o generalized abd pain with N/V x 2 days       History Of Present Illness: The patient is a 52 y.o. male with PMH below, presents with abd pain, N/V, polydipsia, ETOH. Pt reports that he has had N/V x2 d. TMTC episodes, NBNB. Worsening diffuse abd pain since yd, crampy, moderate to severe, constant. Has had associated polydipsia. He says today he felt like he could not drink enough. He decided to try chocolate milk and regular [facility]. This did not solve the problem. He also tried to treat his N/V and abd pain w/ drinking extra whiskey which seemed to make his Sx worse. He is a daily drinker of 12+ beers/d. He also drinks variable amounts of whiskey daily. He says he hides the whiskey in the garage so his wife doesn't find out. He denies Hx of DT's or Sz w/ cessation but says he does get \"shaky\". Lastly, he is requiring 2L O2 after receiving 2L bolus and multiple rounds of IV pain Rx in the preceding hours. He is not normally on O2. He has been a long term smoker but denies Hx of COPD.      Past Medical History:        Diagnosis Date    Anxiety     Behavior problem     due to pain    Bipolar 1 disorder ([facility])     Diabetes mellitus ([facility] [address]     Drug tolerance     opiod    Facet arthropathy     Fracture of cervical vertebra, C7 ([facility])     GERD (gastroesophageal reflux disease)     Hyperlipemia     Hypertension     Impingement syndrome of left shoulder     Mood disorder ([facility])     Myofascial pain     Pains, face     Spondylolysis     Substance abuse/dependence     methadone       Past Surgical History:        Procedure Laterality Date    HERNIA REPAIR      testicle    WRIST FRACTURE SURGERY         Medications Prior to Admission:    Prior to Admission medications Medication Sig Start Date End Date Taking? Authorizing Provider   Blood Glucose Monitoring Suppl ([address]) w/Device KIT  6/29/21   Historical Provider, MD   Lancet Devices (EASY MINI EJECT LANCING DEVICE) MISC  6/29/21   Historical Provider, MD   esomeprazole ([address]) 20 MG delayed release capsule  4/11/21   Historical Provider, MD   lisinopril (PRINIVIL;ZESTRIL) 20 MG tablet  5/26/21   Historical Provider, MD   albuterol sulfate  (90 Base) MCG/ACT inhaler  5/10/21   Historical Provider, MD   LOSARTAN POTASSIUM PO Take by mouth    Historical Provider, MD   ibuprofen (ADVIL;MOTRIN) 800 MG tablet Take 1 tablet by mouth every 6 hours as needed for Pain 8/21/19   Praful Finney PA-C   insulin glargine (BASAGLAR KWIKPEN) 100 UNIT/ML injection pen Inject 18 Units into the skin daily 2/8/19   Annita Hathaway MD   Insulin Pen Needle (PEN NEEDLES 3/16\") 31G X 5 MM MISC 1 each by Does not apply route daily 2/8/19   Annita Hathaway MD   blood glucose test strips (ASCENSIA AUTODISC VI;ONE TOUCH ULTRA TEST VI) strip 1 each by In Vitro route daily As needed. 2/8/19   Annita Hathaway MD   SOFT TOUCH LANCETS MISC Use once daily 2/8/19   Annita Hathaway MD   diphenhydrAMINE-APAP, sleep, (TYLENOL PM EXTRA STRENGTH)  MG tablet Take 1 tablet by mouth nightly as needed for Sleep    Historical Provider, MD       Allergies:  Tramadol    Social History:    TOBACCO:   reports that he has been smoking cigarettes. He has a 30.00 pack-year smoking history. He has never used smokeless tobacco.  ETOH:   reports current alcohol use of about 10.0 standard drinks per week. Family History:  Reviewed in detail and negative for DM, Early CAD, Cancer (except as below).  Positive as follows:        Problem Relation Age of Onset    Stroke Mother     Heart Attack Father        REVIEW OF SYSTEMS:   Pertinent positives/negatives as follows: abd pain, N/V, polydipsia, ETOH., and as discussed in HPI, otherwise a complete ROS performed and all other systems are negative. PHYSICAL EXAM PERFORMED:  BP (!) 172/102   Pulse (!) 111   Temp 98.6 °F (37 °C) (Oral)   Resp 18   Ht 5' 8\" (1.727 m)   Wt 205 lb (93 kg)   SpO2 95%   BMI 31.17 kg/m²   GEN:  A&Ox3, appears ill and very dry. HEENT:  NC/AT,EOMI, dry MM, no erythema/exudates or visible masses. CVS:  Normal S1,S2. Tachy RRR. Without M/G/R.   LUNG:   Course central sounds. No wheezes, rales. Tachypnea. ABD:  Soft, + distended, mild diffuse ttp, BS+ x4. Without G/R.  EXT: 2+ pulses, no c/c/e. Brisk cap refill. PSY:  Thought process intact, affect appropriate. ADONAY:  CN III-XII grossly intact. Moves all 4 spontaneously. Sensory grossly intact. SKIN: No rash or lesions on visible skin. Chart review shows recent radiographs:  CT ABDOMEN PELVIS W IV CONTRAST Additional Contrast? None    Result Date: 9/17/2022  EXAMINATION: CT OF THE ABDOMEN AND PELVIS WITH CONTRAST 9/17/2022 5:47 pm TECHNIQUE: CT of the abdomen and pelvis was performed with the administration of intravenous contrast. Multiplanar reformatted images are provided for review. Automated exposure control, iterative reconstruction, and/or weight based adjustment of the mA/kV was utilized to reduce the radiation dose to as low as reasonably achievable. COMPARISON: 07/08/2008 HISTORY: ORDERING SYSTEM PROVIDED HISTORY: abd pain, n/v. hyperglycemia TECHNOLOGIST PROVIDED HISTORY: Additional Contrast?->None Reason for exam:->abd pain, n/v. hyperglycemia Decision Support Exception - unselect if not a suspected or confirmed emergency medical condition->Emergency Medical Condition (MA) Reason for Exam: Abdominal pain, nausea, vomiting, hyperglycemia FINDINGS: Lower Chest: There is bibasilar atelectasis. Coronary artery calcifications are a marker of atherosclerosis. Organs: The spleen, adrenal glands and kidneys are unremarkable.   The liver is enlarged measuring 23 cm in length and diffusely low in attenuation consistent with hepatic steatosis. However, there is mild-to-moderate peripancreatic inflammatory stranding due to acute pancreatitis. The pancreas enhances homogeneously. The inflammatory stranding extends along the duodenum and right upper quadrant along the ascending colon causing secondary entero colitis. There is no drainable fluid collection. GI/Bowel: There is no bowel obstruction. The appendix is within normal limits. Pelvis: The pelvic viscera are within normal limits. The bladder is moderately distended and unremarkable. Peritoneum/Retroperitoneum: A trace amount of pelvic ascites is noted with mild mesenteric edema. There is no adenopathy. Mild to moderate atherosclerosis involves the abdominal aorta and bilateral common iliac arteries. Bones/Soft Tissues: Degenerative changes involve the thoracolumbar spine. 1. Acute pancreatitis causing secondary entero colitis. 2. Trace amount of pelvic ascites and mild mesenteric edema.        EKG:    EKG 12 Lead [[id number]]    Collected: 09/17/22 1753    Updated: 09/17/22 1817     Ventricular Rate 123 BPM    Atrial Rate 123 BPM    P-R Interval 132 ms    QRS Duration 90 ms    Q-T Interval 344 ms    QTc Calculation (Bazett) 492 ms    P Axis 67 degrees    R Axis 71 degrees    T Axis 58 degrees    Diagnosis Sinus tachycardiaPossible Left atrial enlargementBorderline ECGWhen compared with ECG of 06-FEB-2019 19:17,Nonspecific T wave abnormality no longer evident in Anterolateral leads       CBC:  Recent Labs     09/17/22  1655   WBC 8.6   HGB 15.9   HCT 46.9         RENAL  Recent Labs     09/17/22  1655   *   K 3.9   CL 81*   CO2 19*   PHOS 4.7   BUN 11   CREATININE 0.8*   GLUCOSE 656*     Hemoglobin a1c:  Lab Results   Component Value Date    LABA1C 11.9 02/07/2019    LABA1C 11.6 02/06/2019    LABA1C 11.7 02/06/2019       LFT'S:  Recent Labs     09/17/22  1655   AST 40*   ALT 91*   BILITOT 1.3*   ALKPHOS 123     CARDIAC ENZYMES: Recent Labs     09/17/22  1655   TROPONINI <0.01     LACTIC ACID:  Recent Labs     09/17/22  1655   LACTSEPSIS 5.5*     U/A:  Recent Labs     09/17/22  1705   LEUKOCYTESUR Negative   BACTERIA Rare*   WBCUA None seen   COLORU Yellow   RBCUA 0-2   CLARITYU Clear   SPECGRAV <=1.005   BLOODU TRACE-INTACT*   GLUCOSEU >=1000*     VBG:  Recent Labs     09/17/22  1655   PHVEN 7.379   BQH3FUO 34.1*   NIQ5EMI 19.7*   PO2VEN 61.0*   S8KKIQIE 91        PHYSICIAN CERTIFICATION  I certify that Renetta Reynolds is expected to be hospitalized for 2 midnights based on the following assessment and plan:    ASSESSMENT/PLAN:  DKA, resolved, gap closed. Still admitted to ICU as he still appears quite ill. Initially met DKA criteria but after Tx at [facility] his AG closed. HCO3 on VBG 19.7, AG 22, , ?-hydroxybutyrate added on. DKA protocol at [facility] and stopped immediately prior to xfer 2/2 closed AG. Will leave on high dose IVF and frequent labs of q6 for now. HTN urgency, worsening since arrival to ED. Persistent tachycardia in 130's to 140's. Will give dose of home Rx and then try to use IV PRN's.  BP's are borderline for where I would start a gtt. Intensivist c/s. Admit to ICU. Acute respiratory failure with hypoxia, likely related to pain Rx vs aspiration. No CXR at [facility]. Ordered one stat for arrival here. Supplemental O2 to maintain SPO2 ? 92%, continuous pulse ox. Satting 88% on RA in the ER. Currently requiring 2 L O2. Patient normally not on O2 at home. Wean as tolerated. Acute pancreatitis, likely ETOH related. Lipase 2917. IVF per DKA protocol. Low dose Dilaudid, PRN antiemetics. On high dose ibuprofen per med rec, DC.  GI c/s. Alcohol dependence with probable mild acute intoxication, YUNIOR 133. Hx of tremors w/ cessation but no seizures/delirium. CIWA with Ativan. Banana bag x1 and then PO MVT, folate/thiamine. Fall and Seizure precautions. Denies SI/HI. Lactic acidosis, 5.5, 3.7, 3.5.   IVF and repeats ordered. Tobacco Abuse, counseled cessation, 21 mg nicotine patch. GERD, PPI converted to IV. HTN, poor control. Losartan and lisinopril on Med Rec. Pharmacy working on which one he is on. DVT Prophylaxis: Lx  Diet: NPO changed to CC clears for ON. He is able to tolerate PO. Advance in am.    Code Status: Full Code   PT/OT Eval Status: Will order if needed and as patient condition allows  Dispo - Admit to inpatient ICU    Total critical care time caring for this patient with life threatening, unstable organ failure, including direct patient contact, management of life support systems, review of data including imaging and labs, discussions with other team members and physicians is 33 minutes so far today, excluding procedures. Pam Pendleton MD    Thank you SHERIN Perry - TITUS for the opportunity to be involved in this patient's care. If you have any questions or concerns please feel free to contact me via the [employer] at [phone]. This chart was generated using the [address] [place] [address] dictation system. I created this record but it may contain dictation errors given the limitations of this technology.

## 2022-09-17 NOTE — ED PROVIDER NOTES
[address] [facility] Emergency Department      CHIEF COMPLAINT  Abdominal Pain (C/o generalized abd pain with N/V x 2 days)      HISTORY OF PRESENT ILLNESS  Rosie Perry is a 52 y.o. male with a history of bipolar disorder, diabetes, hypertension and hyperlipidemia presents with several days of intractable nausea and vomiting. He thought he may have COVID so he did a home COVID test a couple days ago that was negative. Today he developed abdominal pain that is crampy, moreso in the epigastric region. .  His vomit has been nonbloody. He reports mild constipation, no diarrhea. No chest pain or shortness of breath. He states he did take his insulin this morning but has been so thirsty that he did drink a full-strength [facility] and chocolate milk before coming to the ER. He denies fevers. No other complaints, modifying factors or associated symptoms. I have reviewed the following from the nursing documentation.     Past Medical History:   Diagnosis Date    Anxiety     Behavior problem     due to pain    Bipolar 1 disorder ([facility])     Diabetes mellitus ([facility] [address]     Drug tolerance     opiod    Facet arthropathy     Fracture of cervical vertebra, C7 ([facility])     GERD (gastroesophageal reflux disease)     Hyperlipemia     Hypertension     Impingement syndrome of left shoulder     Mood disorder ([facility])     Myofascial pain     Pains, face     Spondylolysis     Substance abuse/dependence     methadone     Past Surgical History:   Procedure Laterality Date    HERNIA REPAIR      testicle    WRIST FRACTURE SURGERY       Family History   Problem Relation Age of Onset    Stroke Mother     Heart Attack Father      Social History     Socioeconomic History    Marital status:      Spouse name: Not on file    Number of children: Not on file    Years of education: Not on file    Highest education level: Not on file   Occupational History    Not on file   Tobacco Use    Smoking status: Every Day     Packs/day: 1.00     Years: 30.00     Pack years: 30.00     Types: Cigarettes    Smokeless tobacco: Never   Vaping Use    Vaping Use: Never used   Substance and Sexual Activity    Alcohol use:  Yes     Alcohol/week: 10.0 standard drinks     Types: 5 Glasses of wine, 5 Shots of liquor per week     Comment: drinks on weekends     Drug use: No    Sexual activity: Yes     Partners: Female   Other Topics Concern    Not on file   Social History Narrative    Not on file     Social Determinants of Health     Financial Resource Strain: Not on file   Food Insecurity: Not on file   Transportation Needs: Not on file   Physical Activity: Not on file   Stress: Not on file   Social Connections: Not on file   Intimate Partner Violence: Not on file   Housing Stability: Not on file     Current Facility-Administered Medications   Medication Dose Route Frequency Provider Last Rate Last Admin    nicotine (NICODERM CQ) 14 MG/24HR             dextrose bolus 10% 125 mL  125 mL IntraVENous PRN Eusebio Mendenhall MD        Or    dextrose bolus 10% 250 mL  250 mL IntraVENous PRN Eusebio Mendenhall MD        dextrose 5 % and 0.45 % sodium chloride infusion   IntraVENous Continuous PRN Eusebio Mendenhall MD        insulin regular (HUMULIN R;NOVOLIN R) 100 Units in sodium chloride 0.9 % 100 mL infusion  0.01-0.5 Units/kg/hr IntraVENous Continuous Eusebio Mendenhall MD 9.3 mL/hr at 09/17/22 1814 0.1 Units/kg/hr at 09/17/22 1814    nicotine (NICODERM CQ) 21 MG/24HR 1 patch  1 patch TransDERmal Daily Cielo Salmeron MD         Current Outpatient Medications   Medication Sig Dispense Refill    Blood Glucose Monitoring Suppl (ONETOUCH VERIO FLEX SYSTEM) w/Device KIT       Lancet Devices (EASY MINI EJECT LANCING DEVICE) MISC       esomeprazole (NEXIUM) 20 MG delayed release capsule       lisinopril (PRINIVIL;ZESTRIL) 20 MG tablet       albuterol sulfate  (90 Base) MCG/ACT inhaler       LOSARTAN POTASSIUM PO Take by mouth      ibuprofen (ADVIL;MOTRIN) 800 MG tablet Take 1 tablet by mouth every 6 hours as needed for Pain 20 tablet 0    insulin glargine (BASAGLAR KWIKPEN) 100 UNIT/ML injection pen Inject 18 Units into the skin daily 5 pen 3    Insulin Pen Needle (PEN NEEDLES 3/16\") 31G X 5 MM MISC 1 each by Does not apply route daily 100 each 3    blood glucose test strips (ASCENSIA AUTODISC VI;ONE TOUCH ULTRA TEST VI) strip 1 each by In Vitro route daily As needed. 100 each 3    SOFT TOUCH LANCETS MISC Use once daily 100 each 3    diphenhydrAMINE-APAP, sleep, (TYLENOL PM EXTRA STRENGTH)  MG tablet Take 1 tablet by mouth nightly as needed for Sleep       Allergies   Allergen Reactions    Tramadol Rash, Hives and Swelling       REVIEW OF SYSTEMS      General:  No fevers. Malaise and excessive thirst.  Eyes:  No recent vison changes  ENT:  No sore throat, no nasal congestion  Cardiovascular:  no palpitations  Respiratory:   no cough, no wheezing  Gastrointestinal: Abdominal pain, nausea and vomiting. Musculoskeletal:  No muscle pain, no joint pain  Skin:  No rash   Neurologic:  No speech problems, no headache, no extremity numbness, no extremity weakness  Genitourinary:  No dysuria  Extremities:  no edema, no pain      Unless otherwise stated in this report, this patient's positive and negative responses for review of systems (constitutional, eyes, ENT, cardiovascular, respiratory, gastrointestinal, neurological, genitourinary, musculoskeletal, integument systems and systems related to the presenting problem) are either stated in the preceding paragraph, were not pertinent or were negative for the symptoms and/or complaints related to the medical problem. PHYSICAL EXAM  BP (!) 165/98   Pulse (!) 124   Temp 98.6 °F (37 °C) (Oral)   Resp 24   Ht 5' 8\" (1.727 m)   Wt 205 lb (93 kg)   SpO2 96%   BMI 31.17 kg/m²   GENERAL APPEARANCE: Awake and alert. Cooperative. Ill-appearing, diaphoretic. HEAD: Normocephalic. Atraumatic. EYES: PERRL. EOM's grossly intact.   ENT: Mucous membranes are dry. NECK: Supple, trachea midline. HEART: Tachycardic, normal rhythm. LUNGS: Respirations unlabored. CTAB. Good air exchange. No wheezes, rales, or rhonchi. Speaking comfortably in full sentences. ABDOMEN: Distended with diffuse tenderness. EXTREMITIES: No peripheral edema. MAEE. No acute deformities. SKIN: Intact. No acute rashes. NEUROLOGICAL: Alert and oriented X 3. CN II-XII grossly intact. Strength 5/5, sensation intact. PSYCHIATRIC: Normal mood and affect. LABS  I have reviewed all labs for this visit.    Results for orders placed or performed during the hospital encounter of 09/17/22   COVID-19, Rapid    Specimen: Nasopharyngeal Swab   Result Value Ref Range    SARS-CoV-2, NAAT Not Detected Not Detected   Blood Gas, Venous   Result Value Ref Range    pH, Mohan 7.379 7.350 - 7.450    pCO2, Mohan 34.1 (L) 40.0 - 50.0 mmHg    pO2, Mohan 61.0 (H) 25.0 - 40.0 mmHg    HCO3, Venous 19.7 (L) 23.0 - 29.0 mmol/L    Base Excess, Mohan -4.4 (L) -3.0 - 3.0 mmol/L    O2 Sat, Mohan 91 Not Established %    Carboxyhemoglobin 6.6 (H) 0.0 - 1.5 %    MetHgb, Mohan 0.3 <1.5 %    TC02 (Calc), Mohan 21 Not Established mmol/L    O2 Content, Mohan 20 Not Established VOL %    O2 Therapy Unknown    CBC with Auto Differential   Result Value Ref Range    WBC 8.6 4.0 - 11.0 K/uL    RBC 4.71 4.20 - 5.90 M/uL    Hemoglobin 15.9 13.5 - 17.5 g/dL    Hematocrit 46.9 40.5 - 52.5 %    MCV 99.4 80.0 - 100.0 fL    MCH 33.8 26.0 - 34.0 pg    MCHC 34.0 31.0 - 36.0 g/dL    RDW 14.5 12.4 - 15.4 %    Platelets 613 979 - 529 K/uL    MPV 6.8 5.0 - 10.5 fL    PLATELET SLIDE REVIEW Adequate     SLIDE REVIEW see below     Neutrophils % 78.0 %    Lymphocytes % 9.0 %    Monocytes % 10.0 %    Eosinophils % 0.0 %    Basophils % 0.0 %    Neutrophils Absolute 7.0 1.7 - 7.7 K/uL    Lymphocytes Absolute 0.8 (L) 1.0 - 5.1 K/uL    Monocytes Absolute 0.9 0.0 - 1.3 K/uL    Eosinophils Absolute 0.0 0.0 - 0.6 K/uL    Basophils Absolute 0.0 0.0 - 0.2 K/uL Bands Relative 3 0 - 7 %   Comprehensive Metabolic Panel w/ Reflex to MG   Result Value Ref Range    Sodium 122 (L) 136 - 145 mmol/L    Potassium reflex Magnesium 3.9 3.5 - 5.1 mmol/L    Chloride 81 (L) 99 - 110 mmol/L    CO2 19 (L) 21 - 32 mmol/L    Anion Gap 22 (H) 3 - 16    Glucose 656 (HH) 70 - 99 mg/dL    BUN 11 7 - 20 mg/dL    Creatinine 0.8 (L) 0.9 - 1.3 mg/dL    GFR Non-African American >60 >60    GFR African American >60 >60    Calcium 9.8 8.3 - 10.6 mg/dL    Total Protein 8.0 6.4 - 8.2 g/dL    Albumin 4.4 3.4 - 5.0 g/dL    Albumin/Globulin Ratio 1.2 1.1 - 2.2    Total Bilirubin 1.3 (H) 0.0 - 1.0 mg/dL    Alkaline Phosphatase 123 40 - 129 U/L    ALT 91 (H) 10 - 40 U/L    AST 40 (H) 15 - 37 U/L   Urinalysis with Reflex to Culture    Specimen: Urine, clean catch   Result Value Ref Range    Color, UA Yellow Straw/Yellow    Clarity, UA Clear Clear    Glucose, Ur >=1000 (A) Negative mg/dL    Bilirubin Urine Negative Negative    Ketones, Urine Negative Negative mg/dL    Specific Gravity, UA <=1.005 1.005 - 1.030    Blood, Urine TRACE-INTACT (A) Negative    pH, UA 6.0 5.0 - 8.0    Protein, UA Negative Negative mg/dL    Urobilinogen, Urine 0.2 <2.0 E.U./dL    Nitrite, Urine Negative Negative    Leukocyte Esterase, Urine Negative Negative    Microscopic Examination YES     Urine Type Voided     Urine Reflex to Culture Not Indicated    Lipase   Result Value Ref Range    Lipase 2,917.0 (H) 13.0 - 60.0 U/L   Lactate, Sepsis   Result Value Ref Range    Lactic Acid, Sepsis 5.5 (HH) 0.4 - 1.9 mmol/L   Microscopic Urinalysis   Result Value Ref Range    WBC, UA None seen 0 - 5 /HPF    RBC, UA 0-2 0 - 4 /HPF    Epithelial Cells, UA 0-1 0 - 5 /HPF    Bacteria, UA Rare (A) None Seen /HPF   Phosphorus   Result Value Ref Range    Phosphorus 4.7 2.5 - 4.9 mg/dL   Magnesium   Result Value Ref Range    Magnesium 2.00 1.80 - 2.40 mg/dL   Troponin   Result Value Ref Range    Troponin <0.01 <0.01 ng/mL   POCT Glucose   Result Value Ref Range    POC Glucose >600 (AA) 70 - 99 mg/dl    Performed on ACCU-CHEK    EKG 12 Lead   Result Value Ref Range    Ventricular Rate 123 BPM    Atrial Rate 123 BPM    P-R Interval 132 ms    QRS Duration 90 ms    Q-T Interval 344 ms    QTc Calculation (Bazett) 492 ms    P Axis 67 degrees    R Axis 71 degrees    T Axis 58 degrees    Diagnosis       Sinus tachycardiaPossible Left atrial enlargementBorderline ECGWhen compared with ECG of 06-FEB-2019 19:17,Nonspecific T wave abnormality no longer evident in Anterolateral leads       EKG  The Ekg interpreted by myself  sinus tachycardia, igef=[id number]  with a rate of 123  Axis is   Normal  QTc is  normal  Intervals and Durations are unremarkable. No specific ST-T wave changes appreciated. No evidence of acute ischemia. No significant change from prior EKG dated February 6, 2019    Cardiac Monitoring: The cardiac monitor revealed sinus tachycardia as interpreted by me. The cardiac monitor was ordered secondary to the patient's complaint of nausea and vomiting and to monitor the patient for dysrhythmia. RADIOLOGY  X-RAYS: ALL IMAGES INCLUDING PLAIN FILMS, CT, ULTRASOUND AND MRI HAVE BEEN READ BY THE RADIOLOGIST. I have personally reviewed plain film images and have reviewed the radiology reports. CT ABDOMEN PELVIS W IV CONTRAST Additional Contrast? None   Final Result   1. Acute pancreatitis causing secondary entero colitis. 2. Trace amount of pelvic ascites and mild mesenteric edema. Rechecks: Physical assessment performed. Patient's heart rate is slowly starting to respond to IV fluids. Has been updated on his CT and lab work findings. He is agreeable to admission.         Critical Care:I personally spent a total of 60 minutes of critical care time in obtaining history, performing a physical exam, bedside monitoring of interventions, collecting and interpreting tests and discussion with consultants but excluding time spent performing procedures, treating other patients and teaching time. Sepsis:  Is this patient to be included in the SEP-1 Core Measure due to severe sepsis or septic shock? No   Exclusion criteria - the patient is NOT to be included for SEP-1 Core Measure due to: Alternative explanation for abnormal labs/vitals that do not relate to sepsis, see MDM for further explanation           ED COURSE/MDM  Patient seen and evaluated. Here the patient is afebrile with tachycardia and tachypnea on arrival. old records reviewed. Here the patient appears dehydrated and is tachycardic. His abdomen is diffusely tender but most of his pain is in the epigastric region. Mild distention. Blood glucose on arrival is reading \"high\" on the glucometer. He has a history of DKA in the past.  Lab work is showing evidence of DKA with an elevated anion gap and slightly depressed CO2. Beta hydroxybutyrate is pending. Lab work is also significant for elevated lipase greater than 2000. EKG shows sinus tachycardia, troponin is negative. Magnesium and phosphorus are normal.  Urinalysis shows no UTI, COVID test is negative. CT scan is showing acute pancreatitis. On further questioning the patient does admit that he drinks alcohol every day. He states he does get shaky when he does not get a drink so I will place him on CIWA protocol. He has been given 2 L of normal saline and has been started on an insulin drip. He has been made n.p.o. He was requesting a nicotine patch which I have given as well. I will transfer him to the ICU at [facility] and have reached out to both the hospitalist and the intensivist.  Labs and imaging reviewed and results discussed with patient. Patient was reassessed as noted above . Plan of care discussed with patient. Patient in agreement with plan. CLINICAL IMPRESSION  1.  Diabetic ketoacidosis without coma associated with diabetes mellitus due to underlying condition ([facility] [address]    2. Alcohol-induced acute pancreatitis without infection or necrosis        Blood pressure (!) 165/98, pulse (!) 124, temperature 98.6 °F (37 °C), temperature source Oral, resp. rate 24, height 5' 8\" (1.727 m), weight 205 lb (93 kg), SpO2 96 %. DISPOSITION  Miley Dubois was admitted in stable condition. Lina Velazquez MD am the primary clinician of record.     (Please note this note was completed with a voice recognition program.  Efforts were made to edit the dictations but occasionally words are mis-transcribed.)        Zuhair Burt MD  09/17/22 [id number]

## 2022-09-17 NOTE — ED NOTES
Pts wife into tx rm.  Pt remains A & O x 3 and without s/s distress, resps even and unlab     Pedro Mandel RN  09/17/22 2029

## 2022-09-18 ENCOUNTER — APPOINTMENT (OUTPATIENT)
Dept: ULTRASOUND IMAGING | Age: 47
DRG: 282 | End: 2022-09-18
Payer: MEDICARE

## 2022-09-18 PROBLEM — I10 PRIMARY HYPERTENSION: Status: ACTIVE | Noted: 2022-09-18

## 2022-09-18 PROBLEM — K85.20 ALCOHOL-INDUCED ACUTE PANCREATITIS WITHOUT INFECTION OR NECROSIS: Status: ACTIVE | Noted: 2022-09-18

## 2022-09-18 PROBLEM — F10.930 ALCOHOL WITHDRAWAL SYNDROME WITHOUT COMPLICATION (HCC): Status: ACTIVE | Noted: 2022-09-18

## 2022-09-18 PROBLEM — E08.10 DIABETIC KETOACIDOSIS WITHOUT COMA ASSOCIATED WITH DIABETES MELLITUS DUE TO UNDERLYING CONDITION (HCC): Status: ACTIVE | Noted: 2022-09-18

## 2022-09-18 LAB
ALBUMIN SERPL-MCNC: 4.1 G/DL (ref 3.4–5)
ALP BLD-CCNC: 96 U/L (ref 40–129)
ALT SERPL-CCNC: 72 U/L (ref 10–40)
AMPHETAMINE SCREEN, URINE: ABNORMAL
ANION GAP SERPL CALCULATED.3IONS-SCNC: 11 MMOL/L (ref 3–16)
ANION GAP SERPL CALCULATED.3IONS-SCNC: 11 MMOL/L (ref 3–16)
ANION GAP SERPL CALCULATED.3IONS-SCNC: 9 MMOL/L (ref 3–16)
AST SERPL-CCNC: 48 U/L (ref 15–37)
BARBITURATE SCREEN URINE: ABNORMAL
BASOPHILS ABSOLUTE: 0.1 K/UL (ref 0–0.2)
BASOPHILS RELATIVE PERCENT: 0.7 %
BENZODIAZEPINE SCREEN, URINE: ABNORMAL
BILIRUB SERPL-MCNC: 1.7 MG/DL (ref 0–1)
BILIRUBIN DIRECT: 0.8 MG/DL (ref 0–0.3)
BILIRUBIN, INDIRECT: 0.9 MG/DL (ref 0–1)
BUN BLDV-MCNC: 11 MG/DL (ref 7–20)
CALCIUM SERPL-MCNC: 8.5 MG/DL (ref 8.3–10.6)
CALCIUM SERPL-MCNC: 8.7 MG/DL (ref 8.3–10.6)
CALCIUM SERPL-MCNC: 9 MG/DL (ref 8.3–10.6)
CANNABINOID SCREEN URINE: ABNORMAL
CHLORIDE BLD-SCNC: 89 MMOL/L (ref 99–110)
CO2: 25 MMOL/L (ref 21–32)
CO2: 26 MMOL/L (ref 21–32)
CO2: 26 MMOL/L (ref 21–32)
COCAINE METABOLITE SCREEN URINE: ABNORMAL
CREAT SERPL-MCNC: 0.6 MG/DL (ref 0.9–1.3)
CREAT SERPL-MCNC: 0.6 MG/DL (ref 0.9–1.3)
CREAT SERPL-MCNC: <0.5 MG/DL (ref 0.9–1.3)
EKG ATRIAL RATE: 123 BPM
EKG DIAGNOSIS: NORMAL
EKG P AXIS: 67 DEGREES
EKG P-R INTERVAL: 132 MS
EKG Q-T INTERVAL: 344 MS
EKG QRS DURATION: 90 MS
EKG QTC CALCULATION (BAZETT): 492 MS
EKG R AXIS: 71 DEGREES
EKG T AXIS: 58 DEGREES
EKG VENTRICULAR RATE: 123 BPM
EOSINOPHILS ABSOLUTE: 0 K/UL (ref 0–0.6)
EOSINOPHILS RELATIVE PERCENT: 0.4 %
FENTANYL SCREEN, URINE: ABNORMAL
GFR AFRICAN AMERICAN: >60
GFR NON-AFRICAN AMERICAN: >60
GLUCOSE BLD-MCNC: 143 MG/DL (ref 70–99)
GLUCOSE BLD-MCNC: 159 MG/DL (ref 70–99)
GLUCOSE BLD-MCNC: 159 MG/DL (ref 70–99)
GLUCOSE BLD-MCNC: 161 MG/DL (ref 70–99)
GLUCOSE BLD-MCNC: 163 MG/DL (ref 70–99)
GLUCOSE BLD-MCNC: 172 MG/DL (ref 70–99)
GLUCOSE BLD-MCNC: 174 MG/DL (ref 70–99)
GLUCOSE BLD-MCNC: 176 MG/DL (ref 70–99)
GLUCOSE BLD-MCNC: 178 MG/DL (ref 70–99)
HCT VFR BLD CALC: 42.4 % (ref 40.5–52.5)
HEMOGLOBIN: 14.9 G/DL (ref 13.5–17.5)
INR BLD: 1.05 (ref 0.87–1.14)
LACTIC ACID: 1.5 MMOL/L (ref 0.4–2)
LACTIC ACID: 1.6 MMOL/L (ref 0.4–2)
LIPASE: 1091 U/L (ref 13–60)
LYMPHOCYTES ABSOLUTE: 1.6 K/UL (ref 1–5.1)
LYMPHOCYTES RELATIVE PERCENT: 20.9 %
Lab: ABNORMAL
MAGNESIUM: 1.7 MG/DL (ref 1.8–2.4)
MAGNESIUM: 1.9 MG/DL (ref 1.8–2.4)
MAGNESIUM: 1.9 MG/DL (ref 1.8–2.4)
MCH RBC QN AUTO: 34 PG (ref 26–34)
MCHC RBC AUTO-ENTMCNC: 35 G/DL (ref 31–36)
MCV RBC AUTO: 97.1 FL (ref 80–100)
METHADONE SCREEN, URINE: ABNORMAL
MONOCYTES ABSOLUTE: 0.8 K/UL (ref 0–1.3)
MONOCYTES RELATIVE PERCENT: 10.3 %
NEUTROPHILS ABSOLUTE: 5.2 K/UL (ref 1.7–7.7)
NEUTROPHILS RELATIVE PERCENT: 67.7 %
OPIATE SCREEN URINE: POSITIVE
OXYCODONE URINE: ABNORMAL
PDW BLD-RTO: 14.2 % (ref 12.4–15.4)
PERFORMED ON: ABNORMAL
PH UA: 6
PHENCYCLIDINE SCREEN URINE: ABNORMAL
PHOSPHORUS: 1.7 MG/DL (ref 2.5–4.9)
PHOSPHORUS: 1.9 MG/DL (ref 2.5–4.9)
PHOSPHORUS: 2.5 MG/DL (ref 2.5–4.9)
PLATELET # BLD: 122 K/UL (ref 135–450)
PMV BLD AUTO: 6.7 FL (ref 5–10.5)
POTASSIUM SERPL-SCNC: 3.5 MMOL/L (ref 3.5–5.1)
POTASSIUM SERPL-SCNC: 3.6 MMOL/L (ref 3.5–5.1)
POTASSIUM SERPL-SCNC: 3.8 MMOL/L (ref 3.5–5.1)
PROTHROMBIN TIME: 13.6 SEC (ref 11.7–14.5)
RBC # BLD: 4.37 M/UL (ref 4.2–5.9)
SODIUM BLD-SCNC: 124 MMOL/L (ref 136–145)
SODIUM BLD-SCNC: 125 MMOL/L (ref 136–145)
SODIUM BLD-SCNC: 126 MMOL/L (ref 136–145)
TOTAL PROTEIN: 6.6 G/DL (ref 6.4–8.2)
TRIGL SERPL-MCNC: 315 MG/DL (ref 0–150)
TROPONIN: <0.01 NG/ML
TROPONIN: <0.01 NG/ML
WBC # BLD: 7.7 K/UL (ref 4–11)

## 2022-09-18 PROCEDURE — 94761 N-INVAS EAR/PLS OXIMETRY MLT: CPT

## 2022-09-18 PROCEDURE — 6370000000 HC RX 637 (ALT 250 FOR IP): Performed by: INTERNAL MEDICINE

## 2022-09-18 PROCEDURE — 2580000003 HC RX 258: Performed by: INTERNAL MEDICINE

## 2022-09-18 PROCEDURE — 6360000002 HC RX W HCPCS: Performed by: INTERNAL MEDICINE

## 2022-09-18 PROCEDURE — 84478 ASSAY OF TRIGLYCERIDES: CPT

## 2022-09-18 PROCEDURE — 99233 SBSQ HOSP IP/OBS HIGH 50: CPT | Performed by: INTERNAL MEDICINE

## 2022-09-18 PROCEDURE — 80048 BASIC METABOLIC PNL TOTAL CA: CPT

## 2022-09-18 PROCEDURE — C9113 INJ PANTOPRAZOLE SODIUM, VIA: HCPCS | Performed by: INTERNAL MEDICINE

## 2022-09-18 PROCEDURE — 99223 1ST HOSP IP/OBS HIGH 75: CPT | Performed by: INTERNAL MEDICINE

## 2022-09-18 PROCEDURE — 83690 ASSAY OF LIPASE: CPT

## 2022-09-18 PROCEDURE — 85610 PROTHROMBIN TIME: CPT

## 2022-09-18 PROCEDURE — 80076 HEPATIC FUNCTION PANEL: CPT

## 2022-09-18 PROCEDURE — 83735 ASSAY OF MAGNESIUM: CPT

## 2022-09-18 PROCEDURE — 36415 COLL VENOUS BLD VENIPUNCTURE: CPT

## 2022-09-18 PROCEDURE — 85025 COMPLETE CBC W/AUTO DIFF WBC: CPT

## 2022-09-18 PROCEDURE — 2060000000 HC ICU INTERMEDIATE R&B

## 2022-09-18 PROCEDURE — 2500000003 HC RX 250 WO HCPCS: Performed by: INTERNAL MEDICINE

## 2022-09-18 PROCEDURE — 84484 ASSAY OF TROPONIN QUANT: CPT

## 2022-09-18 PROCEDURE — 2700000000 HC OXYGEN THERAPY PER DAY

## 2022-09-18 PROCEDURE — 83605 ASSAY OF LACTIC ACID: CPT

## 2022-09-18 PROCEDURE — 93010 ELECTROCARDIOGRAM REPORT: CPT | Performed by: INTERNAL MEDICINE

## 2022-09-18 PROCEDURE — 76705 ECHO EXAM OF ABDOMEN: CPT

## 2022-09-18 PROCEDURE — 84100 ASSAY OF PHOSPHORUS: CPT

## 2022-09-18 RX ORDER — LABETALOL HYDROCHLORIDE 5 MG/ML
20 INJECTION, SOLUTION INTRAVENOUS EVERY 4 HOURS PRN
Status: DISCONTINUED | OUTPATIENT
Start: 2022-09-18 | End: 2022-09-21 | Stop reason: HOSPADM

## 2022-09-18 RX ORDER — LABETALOL HYDROCHLORIDE 5 MG/ML
10 INJECTION, SOLUTION INTRAVENOUS EVERY 4 HOURS PRN
Status: DISCONTINUED | OUTPATIENT
Start: 2022-09-18 | End: 2022-09-18

## 2022-09-18 RX ORDER — SODIUM CHLORIDE, SODIUM LACTATE, POTASSIUM CHLORIDE, CALCIUM CHLORIDE 600; 310; 30; 20 MG/100ML; MG/100ML; MG/100ML; MG/100ML
INJECTION, SOLUTION INTRAVENOUS CONTINUOUS
Status: DISCONTINUED | OUTPATIENT
Start: 2022-09-18 | End: 2022-09-19

## 2022-09-18 RX ORDER — LABETALOL HYDROCHLORIDE 5 MG/ML
10 INJECTION, SOLUTION INTRAVENOUS ONCE
Status: COMPLETED | OUTPATIENT
Start: 2022-09-18 | End: 2022-09-18

## 2022-09-18 RX ADMIN — FOLIC ACID: 5 INJECTION, SOLUTION INTRAMUSCULAR; INTRAVENOUS; SUBCUTANEOUS at 09:39

## 2022-09-18 RX ADMIN — LISINOPRIL 20 MG: 20 TABLET ORAL at 08:02

## 2022-09-18 RX ADMIN — SODIUM CHLORIDE, POTASSIUM CHLORIDE, SODIUM LACTATE AND CALCIUM CHLORIDE: 600; 310; 30; 20 INJECTION, SOLUTION INTRAVENOUS at 23:28

## 2022-09-18 RX ADMIN — HYDROMORPHONE HYDROCHLORIDE 1 MG: 1 INJECTION, SOLUTION INTRAMUSCULAR; INTRAVENOUS; SUBCUTANEOUS at 00:25

## 2022-09-18 RX ADMIN — HYDROMORPHONE HYDROCHLORIDE 1 MG: 1 INJECTION, SOLUTION INTRAMUSCULAR; INTRAVENOUS; SUBCUTANEOUS at 23:00

## 2022-09-18 RX ADMIN — HYDROMORPHONE HYDROCHLORIDE 1 MG: 1 INJECTION, SOLUTION INTRAMUSCULAR; INTRAVENOUS; SUBCUTANEOUS at 07:58

## 2022-09-18 RX ADMIN — SODIUM CHLORIDE, POTASSIUM CHLORIDE, SODIUM LACTATE AND CALCIUM CHLORIDE: 600; 310; 30; 20 INJECTION, SOLUTION INTRAVENOUS at 09:38

## 2022-09-18 RX ADMIN — MULTIPLE VITAMINS W/ MINERALS TAB 1 TABLET: TAB at 08:01

## 2022-09-18 RX ADMIN — PANTOPRAZOLE SODIUM 40 MG: 40 INJECTION, POWDER, FOR SOLUTION INTRAVENOUS at 08:01

## 2022-09-18 RX ADMIN — CHLORDIAZEPOXIDE HYDROCHLORIDE 10 MG: 10 CAPSULE ORAL at 02:22

## 2022-09-18 RX ADMIN — SODIUM CHLORIDE, POTASSIUM CHLORIDE, SODIUM LACTATE AND CALCIUM CHLORIDE: 600; 310; 30; 20 INJECTION, SOLUTION INTRAVENOUS at 17:11

## 2022-09-18 RX ADMIN — DEXTROSE MONOHYDRATE: 25 INJECTION, SOLUTION INTRAVENOUS at 23:43

## 2022-09-18 RX ADMIN — CHLORDIAZEPOXIDE HYDROCHLORIDE 10 MG: 10 CAPSULE ORAL at 20:09

## 2022-09-18 RX ADMIN — LABETALOL HYDROCHLORIDE 20 MG: 5 INJECTION, SOLUTION INTRAVENOUS at 13:39

## 2022-09-18 RX ADMIN — HYDROMORPHONE HYDROCHLORIDE 1 MG: 1 INJECTION, SOLUTION INTRAMUSCULAR; INTRAVENOUS; SUBCUTANEOUS at 21:00

## 2022-09-18 RX ADMIN — HYDROMORPHONE HYDROCHLORIDE 1 MG: 1 INJECTION, SOLUTION INTRAMUSCULAR; INTRAVENOUS; SUBCUTANEOUS at 14:41

## 2022-09-18 RX ADMIN — INSULIN LISPRO 2 UNITS: 100 INJECTION, SOLUTION INTRAVENOUS; SUBCUTANEOUS at 20:09

## 2022-09-18 RX ADMIN — HYDROMORPHONE HYDROCHLORIDE 1 MG: 1 INJECTION, SOLUTION INTRAMUSCULAR; INTRAVENOUS; SUBCUTANEOUS at 16:37

## 2022-09-18 RX ADMIN — ENOXAPARIN SODIUM 40 MG: 100 INJECTION SUBCUTANEOUS at 08:01

## 2022-09-18 RX ADMIN — INSULIN GLARGINE 9 UNITS: 100 INJECTION, SOLUTION SUBCUTANEOUS at 20:08

## 2022-09-18 RX ADMIN — HYDROMORPHONE HYDROCHLORIDE 1 MG: 1 INJECTION, SOLUTION INTRAMUSCULAR; INTRAVENOUS; SUBCUTANEOUS at 10:30

## 2022-09-18 RX ADMIN — DEXTROSE MONOHYDRATE: 25 INJECTION, SOLUTION INTRAVENOUS at 11:10

## 2022-09-18 RX ADMIN — LABETALOL HYDROCHLORIDE 10 MG: 5 INJECTION, SOLUTION INTRAVENOUS at 04:34

## 2022-09-18 RX ADMIN — CHLORDIAZEPOXIDE HYDROCHLORIDE 10 MG: 10 CAPSULE ORAL at 14:41

## 2022-09-18 RX ADMIN — CHLORDIAZEPOXIDE HYDROCHLORIDE 10 MG: 10 CAPSULE ORAL at 08:02

## 2022-09-18 RX ADMIN — LABETALOL HYDROCHLORIDE 10 MG: 5 INJECTION, SOLUTION INTRAVENOUS at 00:33

## 2022-09-18 RX ADMIN — HYDROMORPHONE HYDROCHLORIDE 1 MG: 1 INJECTION, SOLUTION INTRAMUSCULAR; INTRAVENOUS; SUBCUTANEOUS at 04:17

## 2022-09-18 RX ADMIN — LABETALOL HYDROCHLORIDE 20 MG: 5 INJECTION, SOLUTION INTRAVENOUS at 23:29

## 2022-09-18 RX ADMIN — SODIUM CHLORIDE, POTASSIUM CHLORIDE, SODIUM LACTATE AND CALCIUM CHLORIDE: 600; 310; 30; 20 INJECTION, SOLUTION INTRAVENOUS at 04:36

## 2022-09-18 RX ADMIN — HYDROMORPHONE HYDROCHLORIDE 1 MG: 1 INJECTION, SOLUTION INTRAMUSCULAR; INTRAVENOUS; SUBCUTANEOUS at 12:31

## 2022-09-18 RX ADMIN — FOLIC ACID 1 MG: 1 TABLET ORAL at 08:02

## 2022-09-18 RX ADMIN — INSULIN LISPRO 2 UNITS: 100 INJECTION, SOLUTION INTRAVENOUS; SUBCUTANEOUS at 23:03

## 2022-09-18 RX ADMIN — Medication 100 MG: at 08:02

## 2022-09-18 RX ADMIN — HYDROMORPHONE HYDROCHLORIDE 1 MG: 1 INJECTION, SOLUTION INTRAMUSCULAR; INTRAVENOUS; SUBCUTANEOUS at 18:54

## 2022-09-18 ASSESSMENT — PAIN SCALES - GENERAL
PAINLEVEL_OUTOF10: 10
PAINLEVEL_OUTOF10: 7
PAINLEVEL_OUTOF10: 10
PAINLEVEL_OUTOF10: 6
PAINLEVEL_OUTOF10: 5
PAINLEVEL_OUTOF10: 10
PAINLEVEL_OUTOF10: 6
PAINLEVEL_OUTOF10: 10
PAINLEVEL_OUTOF10: 0
PAINLEVEL_OUTOF10: 10

## 2022-09-18 ASSESSMENT — PAIN DESCRIPTION - LOCATION
LOCATION: ABDOMEN

## 2022-09-18 ASSESSMENT — PAIN DESCRIPTION - DESCRIPTORS
DESCRIPTORS: ACHING;CRAMPING
DESCRIPTORS: ACHING;CRAMPING
DESCRIPTORS: ACHING
DESCRIPTORS: ACHING;CRAMPING

## 2022-09-18 ASSESSMENT — PAIN - FUNCTIONAL ASSESSMENT
PAIN_FUNCTIONAL_ASSESSMENT: ACTIVITIES ARE NOT PREVENTED
PAIN_FUNCTIONAL_ASSESSMENT: ACTIVITIES ARE NOT PREVENTED

## 2022-09-18 ASSESSMENT — PAIN DESCRIPTION - ORIENTATION
ORIENTATION: RIGHT;LEFT;UPPER
ORIENTATION: MID;LOWER;UPPER
ORIENTATION: RIGHT;LEFT;UPPER
ORIENTATION: MID
ORIENTATION: RIGHT;LEFT;UPPER
ORIENTATION: MID

## 2022-09-18 NOTE — CONSULTS
Pharmacy to enter D10/LR at 75mL/hr continuous per physician consult request..  Infusion orders entered.   Octavio Chandler, [id number] PharmD 9/18/2022 10:51 AM

## 2022-09-18 NOTE — CONSULTS
Consultation Note    Patient Name: Wyatt Bryson  : 1975  Age: 52 y.o. Admitting Physician: Travis Reyna   Date of Admission: 2022  4:47 PM   Primary Care Physician: SHERIN Leblanc - TITUS        Wyatt Bryson is being seen at the request of Travis Reyna for DKA, resp failure, acute pancreatitis, EtOHism. History of Present Illness:  52year old M with PMH significant for anxiety, bipolar 1 disorder, diabetes mellitus, GERD, hyperlipidemia, HTN, myofascial pain, and substance abuse. No prior abdominal surgeries noted. Patient presented to the ER with complaints of abdominal pain, nausea, and excessive thirst. He reports he has been feeling unwell for the last 1 week. He initially thought he had COVID but once he started with excessive thirst he felt the same as he had in the past with DKA. Given his abdominal pain CT A/P was obtained as well as a lipase and findings consistent with acute pancreatitis. Denies prior history of pancreatitis. He does report daily EtOH use for several years. Drinks at minimum 5 beers per day but typically more. Also will drink whiskey in addition to the beer. Last weekend he reports drinking more EtOH than usual.   No prior history of gallstones or gallbladder issues. Reports compliance with diabetic medications. Triglycerides elevated at 315. Calcium unremarkable. No new medications.      GI History:  None noted     Past Medical History:  Past Medical History:   Diagnosis Date    Anxiety     Behavior problem     due to pain    Bipolar 1 disorder (HCC)     Diabetes mellitus ([address]     Drug tolerance     opiod    Facet arthropathy     Fracture of cervical vertebra, C7 (HCC)     GERD (gastroesophageal reflux disease)     Hyperlipemia     Hypertension     Impingement syndrome of left shoulder     Mood disorder (HCC)     Myofascial pain     Pains, face     Spondylolysis     Substance abuse/dependence     methadone        Past Surgical History:  Past Surgical History:   Procedure Laterality Date    HERNIA REPAIR      testicle    WRIST FRACTURE SURGERY          Historical Medications:  Prior to Visit Medications    Medication Sig Taking? Authorizing Provider   Blood Glucose Monitoring Suppl ([address]) w/Device KIT   Historical Provider, MD   Lancet Devices (EASY MINI EJECT LANCING DEVICE) MISC   Historical Provider, MD   esomeprazole (NEXIUM) 20 MG delayed release capsule   Historical Provider, MD   lisinopril (PRINIVIL;ZESTRIL) 20 MG tablet   Historical Provider, MD   albuterol sulfate  (90 Base) MCG/ACT inhaler   Historical Provider, MD   ibuprofen (ADVIL;MOTRIN) 800 MG tablet Take 1 tablet by mouth every 6 hours as needed for Pain  Geovani Moctezuma PA-C   insulin glargine (BASAGLAR KWIKPEN) 100 UNIT/ML injection pen Inject 18 Units into the skin daily  Taylor Cunningham MD   Insulin Pen Needle (PEN NEEDLES 3/16\") 31G X 5 MM MISC 1 each by Does not apply route daily  Taylor Cunningham MD   blood glucose test strips (ASCENSIA AUTODISC VI;ONE TOUCH ULTRA TEST VI) strip 1 each by In Vitro route daily As needed.   Taylor Cunningham MD   SOFT TOUCH LANCETS MISC Use once daily  Taylor Cunningham MD   diphenhydrAMINE-APAP, sleep, (TYLENOL PM EXTRA STRENGTH)  MG tablet Take 1 tablet by mouth nightly as needed for Sleep  Historical Provider, MD        Hospital Medications:  Current Facility-Administered Medications: labetalol (NORMODYNE;TRANDATE) injection 10 mg, 10 mg, IntraVENous, Q4H PRN  sodium chloride 0.9 % 402 mL with folic acid 1 mg, adult multi-vitamin with vitamin k 10 mL, thiamine 100 mg, , IntraVENous, Daily  mupirocin (BACTROBAN) 2 % ointment, , Nasal, BID  dextrose 5 % and 0.45 % sodium chloride infusion, , IntraVENous, Continuous PRN  nicotine (NICODERM CQ) 21 MG/24HR 1 patch, 1 patch, TransDERmal, Daily  albuterol sulfate HFA (PROVENTIL;VENTOLIN;PROAIR) 108 (90 Base) MCG/ACT inhaler 2 puff, 2 puff, Inhalation, Q6H PRN  HYDROmorphone (DILAUDID) injection 0.5 mg, 0.5 mg, IntraVENous, Q3H PRN **OR** HYDROmorphone (DILAUDID) injection 1 mg, 1 mg, IntraVENous, Q3H PRN  pantoprazole (PROTONIX) injection 40 mg, 40 mg, IntraVENous, Daily  enoxaparin (LOVENOX) injection 40 mg, 40 mg, SubCUTAneous, Daily  dextrose bolus 10% 125 mL, 125 mL, IntraVENous, PRN **OR** dextrose bolus 10% 250 mL, 250 mL, IntraVENous, PRN  potassium chloride 10 mEq/100 mL IVPB (Peripheral Line), 10 mEq, IntraVENous, PRN  magnesium sulfate 1000 mg in dextrose 5% 100 mL IVPB, 1,000 mg, IntraVENous, PRN  sodium phosphate 10 mmol in sodium chloride 0.9 % 250 mL IVPB, 10 mmol, IntraVENous, PRN **OR** sodium phosphate 15 mmol in dextrose 5 % 250 mL IVPB, 15 mmol, IntraVENous, PRN **OR** sodium phosphate 20 mmol in dextrose 5 % 500 mL IVPB, 20 mmol, IntraVENous, PRN  lisinopril (PRINIVIL;ZESTRIL) tablet 20 mg, 20 mg, Oral, Daily  dextrose 5 % and 0.45 % NaCl with KCl 20 mEq infusion, , IntraVENous, Continuous PRN  prochlorperazine (COMPAZINE) injection 10 mg, 10 mg, IntraVENous, Q6H PRN  LORazepam (ATIVAN) tablet 1 mg, 1 mg, Oral, Q1H PRN **OR** LORazepam (ATIVAN) injection 1 mg, 1 mg, IntraVENous, Q1H PRN **OR** LORazepam (ATIVAN) tablet 2 mg, 2 mg, Oral, Q1H PRN **OR** LORazepam (ATIVAN) injection 2 mg, 2 mg, IntraVENous, Q1H PRN **OR** LORazepam (ATIVAN) tablet 3 mg, 3 mg, Oral, Q1H PRN **OR** LORazepam (ATIVAN) injection 3 mg, 3 mg, IntraVENous, Q1H PRN **OR** LORazepam (ATIVAN) tablet 4 mg, 4 mg, Oral, Q1H PRN **OR** LORazepam (ATIVAN) injection 4 mg, 4 mg, IntraVENous, Q1H PRN  insulin glargine (LANTUS) injection vial 9 Units, 9 Units, SubCUTAneous, Nightly  insulin lispro (HUMALOG) injection vial 0-8 Units, 0-8 Units, SubCUTAneous, 6 times per day  chlordiazePOXIDE (LIBRIUM) capsule 10 mg, 10 mg, Oral, Q6H  lactated ringers infusion, , IntraVENous, Continuous     Social History:   Social History       Tobacco History       Smoking Status  Every Day Smoking Frequency  1 pack/day for 30.00 years (30.00 pk-yrs) Smoking Tobacco Type  Cigarettes      Smokeless Tobacco Use  Never              Alcohol History       Alcohol Use Status  Yes Drinks/Week  5 Glasses of wine, 5 Shots of liquor per week Amount  10.0 standard drinks of alcohol/wk Comment  drinks on weekends               Drug Use       Drug Use Status  No              Sexual Activity       Sexually Active  Yes Partners  Female                     Family History:  Family History   Problem Relation Age of Onset    Stroke Mother     Heart Attack Father         Allergies: Allergies   Allergen Reactions    Tramadol Rash, Hives and Swelling        ROS:   General: No fever or weight change  Hematologic: No unexpected submucosal bleeding or bruising  HEENT: No sore throat or facial pain  Respiratory: No cough or dyspnea  Cardiovascular: No angina or dependent edema  Gastrointestinal: See HPI  Musculoskeletal: No usual joint pain or stiffness  Skin: No skin eruptions or changing lesions  Neurologic: No focal weakness or numbness  Psychiatric: No anxiety or sleep disturbance    Physical Exam:  Vital Signs:   Vitals:    09/18/22 0828   BP:    Pulse:    Resp: 18   Temp:    SpO2:        General: Well-nourished, well-developed  HEENT: Sclera anicteric, mucosal membranes moist  Cardiovascular: Regular rate and rhythm. No murmurs. Respiratory: Respirations nonlabored, no crepitus  GI: Abdomen nondistended and soft. +upper abdominal tenderness with palpation. Normal active bowel sounds. Rectal: Deferred  Musculoskeletal: No pitting edema of the lower legs. Neurological: Gross memory appears intact. Patient is alert and oriented      Recent Imaging:   XR CHEST PORTABLE  Narrative: EXAMINATION:  ONE XRAY VIEW OF THE CHEST    9/17/2022 11:47 pm    COMPARISON:  05/10/2021    HISTORY:  ORDERING SYSTEM PROVIDED HISTORY: New O2 requirement. Admitted today for  DKA, acute pancreatitis. Has had N/V.   Aspiration vs pain Rx effect? Hx  ETOHism and is a long term smoker. TECHNOLOGIST PROVIDED HISTORY:  Reason for exam:->New O2 requirement. Admitted today for DKA, acute  pancreatitis. Has had N/V. Aspiration vs pain Rx effect? Hx ETOHism and is  a long term smoker. Reason for Exam: New O2 requirement. Admitted today for DKA, acute  pancreatitis. Has had N/V. Aspiration vs pain Rx effect? Hx ETOHism and is  a long term smoker. FINDINGS:  Cardiac size and mediastinal structures appear similar. Low lung volumes  with bibasilar atelectasis. No acute osseous abnormality. No pneumothorax. Mild degenerative change at the [facility] joints. Impression: Bibasilar atelectasis, otherwise no acute cardiopulmonary process. Labs:   Recent Labs     09/17/22  1655 09/18/22  0413   HGB 15.9 14.9   WBC 8.6 7.7   PROT 8.0 6.6   LABALBU 4.4 4.1   ALKPHOS 123 96   ALT 91* 72*   AST 40* 48*   BILITOT 1.3* 1.7*   BILIDIR  --  0.8*   IBILI  --  0.9        Assessment:    52year old M with PMH significant for anxiety, bipolar 1 disorder, diabetes mellitus, GERD, hyperlipidemia, HTN, myofascial pain, and substance abuse. Presents with abdominal pain, nausea/vomiting, and excessive thirst. Found to have acute pancreatitis and DKA. No prior history of pancreatitis. Reports daily EtOH use and increased use last weekend. No prior history of gallstones or gallbladder issues. Reports compliance with diabetic medications. Triglycerides elevated at 315. Calcium unremarkable. No new medications. Suspect acute pancreatitis 2/2 EtOH, however biliary source has not been excluded. Labs: WBC 7.7, Hgb 14.9, HCT 42.4, Platelet Count 380, Na 125, Crea <0.5, ALP 96, ALT 72, AST 48, T. Bili 1.7    Plan:  Aggressive IV hydration  Trend HCT and Creatinine; avoid hemoconcentration  Check RUQ US to rule out biliary source for pancreatitis  NPO for now.  Ice chips are okay  Pain management per primary team  Encouraged ambulation up out of bed as much as tolerated  Supportive care     GI to follow      SHERIN Castaneda - CNP    [facility]    [phone].  Also available via [employer]

## 2022-09-18 NOTE — CONSULTS
Patient is being seen at the request of Agus Xiao for a consultation for DKA, acute pancreatitis, alcohol    HISTORY OF PRESENT ILLNESS: This is a 71-year-old male with a history of diabetes, bipolar disorder and substance abuse on chronic methadone who presented to [facility] with a 2-day history of severe nausea, associated with emesis, not improved with chocolate milk or whiskey. In the emergency department he was found to be in DKA, have elevated lipase. He has had similar episodes of DKA in the past.  He was treated with insulin with improvement in his metabolic acidosis. PAST MEDICAL HISTORY:  Past Medical History:   Diagnosis Date    Anxiety     Behavior problem     due to pain    Bipolar 1 disorder ([facility])     Diabetes mellitus ([address]     Drug tolerance     opiod    Facet arthropathy     Fracture of cervical vertebra, C7 ([facility])     GERD (gastroesophageal reflux disease)     Hyperlipemia     Hypertension     Impingement syndrome of left shoulder     Mood disorder ([facility])     Myofascial pain     Pains, face     Spondylolysis     Substance abuse/dependence     methadone     PAST SURGICAL HISTORY:  Past Surgical History:   Procedure Laterality Date    HERNIA REPAIR      testicle    WRIST FRACTURE SURGERY         FAMILY HISTORY:  family history includes Heart Attack in his father; Stroke in his mother. SOCIAL HISTORY:   reports that he has been smoking cigarettes. He has a 30.00 pack-year smoking history.  He has never used smokeless tobacco.    Scheduled Meds:   nicotine  1 patch TransDERmal Daily    pantoprazole  40 mg IntraVENous Daily    enoxaparin  40 mg SubCUTAneous Daily    thiamine  100 mg Oral Daily    lisinopril  20 mg Oral Daily    multivitamin  1 tablet Oral Daily    folic acid  1 mg Oral Daily    insulin glargine  9 Units SubCUTAneous Nightly    insulin lispro  0-8 Units SubCUTAneous 6 times per day    chlordiazePOXIDE  10 mg Oral Q6H     Continuous Infusions:   dextrose 5 % and 0.45 % NaCl dextrose 5% and 0.45% NaCl with KCl 20 mEq      lactated ringers 200 mL/hr at 09/18/22 0644     PRN Meds:  labetalol, dextrose 5 % and 0.45 % NaCl, albuterol sulfate HFA, HYDROmorphone **OR** HYDROmorphone, dextrose bolus **OR** dextrose bolus, potassium chloride, magnesium sulfate, sodium phosphate IVPB **OR** sodium phosphate IVPB **OR** sodium phosphate IVPB, dextrose 5% and 0.45% NaCl with KCl 20 mEq, prochlorperazine, LORazepam **OR** LORazepam **OR** LORazepam **OR** LORazepam **OR** LORazepam **OR** LORazepam **OR** LORazepam **OR** LORazepam    ALLERGIES:  Patient is allergic to tramadol. REVIEW OF SYSTEMS:  Constitutional: Negative for fever  HENT: Negative for sore throat  Eyes: Negative for redness   Respiratory: Negative for dyspnea, cough  Cardiovascular: Negative for chest pain  Gastrointestinal: Abdominal pain  Genitourinary: Negative for hematuria   Musculoskeletal: Negative for arthralgias   Skin: Negative for rash  Neurological: Negative for syncope  Hematological: Negative for adenopathy  Psychiatric/Behavorial: Negative for anxiety    PHYSICAL EXAM:  Blood pressure (!) 169/92, pulse (!) 103, temperature 98.7 °F (37.1 °C), temperature source Oral, resp. rate 22, height 5' 8\" (1.727 m), weight 205 lb (93 kg), SpO2 91 %.' on 2 L  General: ill appearing. Eyes: PERRL. No sclera icterus. No conjunctival injection. ENT: No discharge. Pharynx clear. Neck: Trachea midline. Normal thyroid. Resp: No accessory muscle use. No crackles. No wheezing. No rhonchi. No dullness on percussion. CV: Regular rate. Regular rhythm. No mumur or rub. No edema. Peripheral pulses are 2+. Capillary refill is less than 3 seconds. GI: Diffusely tender. Non-distended. No masses. No organomegaly. Normal bowel sounds. No hernia. Skin: Warm and dry. No nodule on exposed extremities. No rash on exposed extremities. Lymph: No cervical LAD. No supraclavicular LAD. M/S: No cyanosis. No joint deformity. No clubbing. Neuro: Alert and oriented x3. Patellar reflexes are symmetric. Psych: No agitation, no anxiety, affect is full. LABS:  CBC:   Recent Labs     09/17/22 1655 09/18/22  0413   WBC 8.6 7.7   HGB 15.9 14.9   HCT 46.9 42.4   MCV 99.4 97.1    122*     BMP:   Recent Labs     09/17/22 1655 09/17/22 2125 09/18/22  0413   * 129* 126*   K 3.9 4.2 3.8   CL 81* 90* 89*   CO2 19* 23 26   PHOS 4.7 3.5 2.5   BUN 11 9 11   CREATININE 0.8* 0.7* 0.6*     LIVER PROFILE:   Recent Labs     09/17/22 1655 09/18/22  0413   AST 40* 48*   ALT 91* 72*   LIPASE 2,917.0*  --    BILIDIR  --  0.8*   BILITOT 1.3* 1.7*   ALKPHOS 123 96     PT/INR: No results for input(s): PROTIME, INR in the last 72 hours. APTT: No results for input(s): APTT in the last 72 hours. UA:  Recent Labs     09/17/22  1705   COLORU Yellow   PHUR 6.0  6.0   WBCUA None seen   RBCUA 0-2   BACTERIA Rare*   CLARITYU Clear   SPECGRAV <=1.005   LEUKOCYTESUR Negative   UROBILINOGEN 0.2   BILIRUBINUR Negative   BLOODU TRACE-INTACT*   GLUCOSEU >=1000*     No results for input(s): PHART, OFO2SSN, PO2ART in the last 72 hours. Cultures:      9/17/2022 SARS-CoV-2 NAAT negative    Chest imaging was reviewed by me and showed:   CXR 9/18/2022 no focal airspace disease    ACT 9/17/2022  Impression   1. Acute pancreatitis causing secondary entero colitis. 2. Trace amount of pelvic ascites and mild mesenteric edema.      ASSESSMENT:  Acute pancreatitis, favor alcoholic pancreatitis  DKA, gap closed quickly  Hypertension, likely 2/2 pain, possible alcohol withdrawal   Hypoxemia, not typically on oxygen  Alcohol dependence with history of withdrawal but no seizures or delirium  Tobacco abuse  Remote history of methadone maintenance use, has been off methadone for many years    PLAN:  Supplemental oxygen to maintain SaO2 >92%; wean as tolerated  IV fluids: D10LR due to type 1 DM and NPO, continue insulin - Lantus 9 HS with med-SSI (typically on 18 U basaglar)  IV

## 2022-09-18 NOTE — PROGRESS NOTES
Reassessment complete. Patient seen awake in chair. Physical assessment unchanged from previous. Patient's wife at bedside provided with update. Patient with call light and personal belongings within reach.

## 2022-09-18 NOTE — PROGRESS NOTES
2237  pt arrived in ICU via stretcher  for [address]  ER. Squad team  states they gave 25mcg of fentanyl in route. Pt AA&O X4, call light in reach and oriented to room and ICU.   Dr Cornelio Ch aware pt arrived in ICU

## 2022-09-18 NOTE — PROGRESS NOTES
RT Inhaler-Nebulizer Bronchodilator Protocol Note    There is a bronchodilator order in the chart from a provider indicating to follow the RT Bronchodilator Protocol and there is an Initiate RT Inhaler-Nebulizer Bronchodilator Protocol order as well (see protocol at bottom of note). CXR Findings:  No results found. The findings from the last RT Protocol Assessment were as follows:   History Pulmonary Disease: Smoker 15 pack years or more  Respiratory Pattern: Regular pattern and RR 12-20 bpm  Breath Sounds: Clear breath sounds  Cough: Strong, spontaneous, non-productive  Indication for Bronchodilator Therapy: None  Bronchodilator Assessment Score: 1    Aerosolized bronchodilator medication orders have been revised according to the RT Inhaler-Nebulizer Bronchodilator Protocol below. Respiratory Therapist to perform RT Therapy Protocol Assessment initially then follow the protocol. Repeat RT Therapy Protocol Assessment PRN for score 0-3 or on second treatment, BID, and PRN for scores above 3. No Indications - adjust the frequency to every 6 hours PRN wheezing or bronchospasm, if no treatments needed after 48 hours then discontinue using Per Protocol order mode. If indication present, adjust the RT bronchodilator orders based on the Bronchodilator Assessment Score as indicated below. Use Inhaler orders unless patient has one or more of the following: on home nebulizer, not able to hold breath for 10 seconds, is not alert and oriented, cannot activate and use MDI correctly, or respiratory rate 25 breaths per minute or more, then use the equivalent nebulizer order(s) with same Frequency and PRN reasons based on the score. If a patient is on this medication at home then do not decrease Frequency below that used at home.     0-3 - enter or revise RT bronchodilator order(s) to equivalent RT Bronchodilator order with Frequency of every 4 hours PRN for wheezing or increased work of breathing using Per Protocol order mode. 4-6 - enter or revise RT Bronchodilator order(s) to two equivalent RT bronchodilator orders with one order with BID Frequency and one order with Frequency of every 4 hours PRN wheezing or increased work of breathing using Per Protocol order mode. 7-10 - enter or revise RT Bronchodilator order(s) to two equivalent RT bronchodilator orders with one order with TID Frequency and one order with Frequency of every 4 hours PRN wheezing or increased work of breathing using Per Protocol order mode. 11-13 - enter or revise RT Bronchodilator order(s) to one equivalent RT bronchodilator order with QID Frequency and an Albuterol order with Frequency of every 4 hours PRN wheezing or increased work of breathing using Per Protocol order mode. Greater than 13 - enter or revise RT Bronchodilator order(s) to one equivalent RT bronchodilator order with every 4 hours Frequency and an Albuterol order with Frequency of every 2 hours PRN wheezing or increased work of breathing using Per Protocol order mode.          Electronically signed by Estela James RCP on 9/17/2022 at 11:26 PM

## 2022-09-18 NOTE — PROGRESS NOTES
Morning assessment complete. Patient seen awake in bed. Patient is alert and oriented x 4. Patient seen on 2 lpm o2 per NC, patient doesn't wear o2 at home, oxygent removed at this time. Patient with c/o pain 10/10 to abdomen, PRN medication given at this time. Physical assessment as charted in flow sheets. Scheduled medications given per orders. IVF infusing at 200 mL/hr. Peripheral IV C/D/I and functioning properly. Patient is able to use urinal.    Patient refused assistance repositioning. Patient educated on use of call light and to call out with needs, verbalized understanding. Call light and personal belongings within reach.

## 2022-09-18 NOTE — PROGRESS NOTES
4 Eyes Skin Assessment     The patient is being assess for   Admission    I agree that 2 RN's have performed a thorough Head to Toe Skin Assessment on the patient. ALL assessment sites listed below have been assessed. Areas assessed for pressure by both nurses:   [x]   Head, Face, and Ears   [x]   Shoulders, Back, and Chest, Abdomen  [x]   Arms, Elbows, and Hands   [x]   Coccyx, Sacrum, and Ischium  []   Legs, Feet, and Heels        Skin Assessed Under all Medical Devices by both nurses:  O2 device tubing              All Mepilex Borders were peeled back and area peeked at by both nurses:  NA               **SHARE this note so that the co-signing nurse is able to place an eSignature**    Co-signer eSignature: Electronically signed by Israel Gordon RN on 9/18/22 at 2:50 AM EDT    Does the Patient have Skin Breakdown related to pressure?    Na skin intact               Juan Prevention initiated:  NA   Wound Care Orders initiated:  NA      WOC nurse consulted for Pressure Injury (Stage 3,4, Unstageable, DTI, NWPT, Complex wounds)and New or Established Ostomies:  NA      Primary Nurse eSignature: Electronically signed by Liban Schmidt RN on 9/18/22 at 2:49 AM EDT

## 2022-09-18 NOTE — PROGRESS NOTES
Care rounds complete with Dr. Benson Ayoub. Discussed pain levels, IVF, elevated blood pressure, and insulin orders. New orders placed by Dr. Benson Ayoub .

## 2022-09-19 LAB
ANION GAP SERPL CALCULATED.3IONS-SCNC: 11 MMOL/L (ref 3–16)
ANION GAP SERPL CALCULATED.3IONS-SCNC: 13 MMOL/L (ref 3–16)
ANION GAP SERPL CALCULATED.3IONS-SCNC: 15 MMOL/L (ref 3–16)
BUN BLDV-MCNC: 11 MG/DL (ref 7–20)
BUN BLDV-MCNC: 12 MG/DL (ref 7–20)
BUN BLDV-MCNC: 12 MG/DL (ref 7–20)
BUN BLDV-MCNC: 13 MG/DL (ref 7–20)
BUN BLDV-MCNC: 13 MG/DL (ref 7–20)
CALCIUM SERPL-MCNC: 8.2 MG/DL (ref 8.3–10.6)
CALCIUM SERPL-MCNC: 8.3 MG/DL (ref 8.3–10.6)
CALCIUM SERPL-MCNC: 8.4 MG/DL (ref 8.3–10.6)
CALCIUM SERPL-MCNC: 8.5 MG/DL (ref 8.3–10.6)
CALCIUM SERPL-MCNC: 8.6 MG/DL (ref 8.3–10.6)
CHLORIDE BLD-SCNC: 91 MMOL/L (ref 99–110)
CHLORIDE BLD-SCNC: 92 MMOL/L (ref 99–110)
CHLORIDE BLD-SCNC: 95 MMOL/L (ref 99–110)
CHLORIDE BLD-SCNC: 97 MMOL/L (ref 99–110)
CHLORIDE BLD-SCNC: 97 MMOL/L (ref 99–110)
CO2: 21 MMOL/L (ref 21–32)
CO2: 22 MMOL/L (ref 21–32)
CO2: 24 MMOL/L (ref 21–32)
CO2: 24 MMOL/L (ref 21–32)
CO2: 25 MMOL/L (ref 21–32)
CREAT SERPL-MCNC: 0.6 MG/DL (ref 0.9–1.3)
CREAT SERPL-MCNC: <0.5 MG/DL (ref 0.9–1.3)
GFR AFRICAN AMERICAN: >60
GFR NON-AFRICAN AMERICAN: >60
GLUCOSE BLD-MCNC: 111 MG/DL (ref 70–99)
GLUCOSE BLD-MCNC: 117 MG/DL (ref 70–99)
GLUCOSE BLD-MCNC: 118 MG/DL (ref 70–99)
GLUCOSE BLD-MCNC: 119 MG/DL (ref 70–99)
GLUCOSE BLD-MCNC: 122 MG/DL (ref 70–99)
GLUCOSE BLD-MCNC: 133 MG/DL (ref 70–99)
GLUCOSE BLD-MCNC: 133 MG/DL (ref 70–99)
GLUCOSE BLD-MCNC: 134 MG/DL (ref 70–99)
GLUCOSE BLD-MCNC: 141 MG/DL (ref 70–99)
GLUCOSE BLD-MCNC: 142 MG/DL (ref 70–99)
MAGNESIUM: 2 MG/DL (ref 1.8–2.4)
MAGNESIUM: 2.1 MG/DL (ref 1.8–2.4)
MAGNESIUM: 2.2 MG/DL (ref 1.8–2.4)
PERFORMED ON: ABNORMAL
PHOSPHORUS: 1.5 MG/DL (ref 2.5–4.9)
PHOSPHORUS: 1.8 MG/DL (ref 2.5–4.9)
PHOSPHORUS: 1.9 MG/DL (ref 2.5–4.9)
PHOSPHORUS: 2 MG/DL (ref 2.5–4.9)
PHOSPHORUS: 3 MG/DL (ref 2.5–4.9)
POTASSIUM SERPL-SCNC: 3.5 MMOL/L (ref 3.5–5.1)
POTASSIUM SERPL-SCNC: 3.5 MMOL/L (ref 3.5–5.1)
POTASSIUM SERPL-SCNC: 3.6 MMOL/L (ref 3.5–5.1)
POTASSIUM SERPL-SCNC: 3.7 MMOL/L (ref 3.5–5.1)
POTASSIUM SERPL-SCNC: 3.8 MMOL/L (ref 3.5–5.1)
SODIUM BLD-SCNC: 126 MMOL/L (ref 136–145)
SODIUM BLD-SCNC: 127 MMOL/L (ref 136–145)
SODIUM BLD-SCNC: 131 MMOL/L (ref 136–145)
SODIUM BLD-SCNC: 132 MMOL/L (ref 136–145)
SODIUM BLD-SCNC: 133 MMOL/L (ref 136–145)

## 2022-09-19 PROCEDURE — 2580000003 HC RX 258: Performed by: INTERNAL MEDICINE

## 2022-09-19 PROCEDURE — 6370000000 HC RX 637 (ALT 250 FOR IP): Performed by: INTERNAL MEDICINE

## 2022-09-19 PROCEDURE — 2500000003 HC RX 250 WO HCPCS: Performed by: INTERNAL MEDICINE

## 2022-09-19 PROCEDURE — 80048 BASIC METABOLIC PNL TOTAL CA: CPT

## 2022-09-19 PROCEDURE — 84100 ASSAY OF PHOSPHORUS: CPT

## 2022-09-19 PROCEDURE — 6360000002 HC RX W HCPCS: Performed by: INTERNAL MEDICINE

## 2022-09-19 PROCEDURE — 99233 SBSQ HOSP IP/OBS HIGH 50: CPT | Performed by: INTERNAL MEDICINE

## 2022-09-19 PROCEDURE — C9113 INJ PANTOPRAZOLE SODIUM, VIA: HCPCS | Performed by: INTERNAL MEDICINE

## 2022-09-19 PROCEDURE — 83735 ASSAY OF MAGNESIUM: CPT

## 2022-09-19 PROCEDURE — 36415 COLL VENOUS BLD VENIPUNCTURE: CPT

## 2022-09-19 PROCEDURE — 1200000000 HC SEMI PRIVATE

## 2022-09-19 RX ORDER — DEXTROSE AND SODIUM CHLORIDE 5; .45 G/100ML; G/100ML
INJECTION, SOLUTION INTRAVENOUS CONTINUOUS
Status: DISCONTINUED | OUTPATIENT
Start: 2022-09-19 | End: 2022-09-21

## 2022-09-19 RX ORDER — CLONIDINE HYDROCHLORIDE 0.1 MG/1
0.1 TABLET ORAL EVERY 4 HOURS PRN
Status: DISCONTINUED | OUTPATIENT
Start: 2022-09-19 | End: 2022-09-21 | Stop reason: HOSPADM

## 2022-09-19 RX ADMIN — CHLORDIAZEPOXIDE HYDROCHLORIDE 10 MG: 10 CAPSULE ORAL at 09:46

## 2022-09-19 RX ADMIN — HYDROMORPHONE HYDROCHLORIDE 1 MG: 1 INJECTION, SOLUTION INTRAMUSCULAR; INTRAVENOUS; SUBCUTANEOUS at 16:26

## 2022-09-19 RX ADMIN — CHLORDIAZEPOXIDE HYDROCHLORIDE 10 MG: 10 CAPSULE ORAL at 01:00

## 2022-09-19 RX ADMIN — POTASSIUM PHOSPHATE, MONOBASIC AND POTASSIUM PHOSPHATE, DIBASIC 30 MMOL: 224; 236 INJECTION, SOLUTION, CONCENTRATE INTRAVENOUS at 01:59

## 2022-09-19 RX ADMIN — SODIUM CHLORIDE, POTASSIUM CHLORIDE, SODIUM LACTATE AND CALCIUM CHLORIDE: 600; 310; 30; 20 INJECTION, SOLUTION INTRAVENOUS at 07:19

## 2022-09-19 RX ADMIN — PANTOPRAZOLE SODIUM 40 MG: 40 INJECTION, POWDER, FOR SOLUTION INTRAVENOUS at 09:46

## 2022-09-19 RX ADMIN — INSULIN GLARGINE 9 UNITS: 100 INJECTION, SOLUTION SUBCUTANEOUS at 20:28

## 2022-09-19 RX ADMIN — LISINOPRIL 20 MG: 20 TABLET ORAL at 09:46

## 2022-09-19 RX ADMIN — HYDROMORPHONE HYDROCHLORIDE 1 MG: 1 INJECTION, SOLUTION INTRAMUSCULAR; INTRAVENOUS; SUBCUTANEOUS at 05:06

## 2022-09-19 RX ADMIN — CHLORDIAZEPOXIDE HYDROCHLORIDE 10 MG: 10 CAPSULE ORAL at 14:02

## 2022-09-19 RX ADMIN — HYDROMORPHONE HYDROCHLORIDE 1 MG: 1 INJECTION, SOLUTION INTRAMUSCULAR; INTRAVENOUS; SUBCUTANEOUS at 20:42

## 2022-09-19 RX ADMIN — CLONIDINE HYDROCHLORIDE 0.1 MG: 0.1 TABLET ORAL at 20:28

## 2022-09-19 RX ADMIN — HYDROMORPHONE HYDROCHLORIDE 1 MG: 1 INJECTION, SOLUTION INTRAMUSCULAR; INTRAVENOUS; SUBCUTANEOUS at 23:37

## 2022-09-19 RX ADMIN — FOLIC ACID: 5 INJECTION, SOLUTION INTRAMUSCULAR; INTRAVENOUS; SUBCUTANEOUS at 11:05

## 2022-09-19 RX ADMIN — MUPIROCIN: 20 OINTMENT TOPICAL at 09:45

## 2022-09-19 RX ADMIN — HYDROMORPHONE HYDROCHLORIDE 1 MG: 1 INJECTION, SOLUTION INTRAMUSCULAR; INTRAVENOUS; SUBCUTANEOUS at 01:00

## 2022-09-19 RX ADMIN — ENOXAPARIN SODIUM 40 MG: 100 INJECTION SUBCUTANEOUS at 09:45

## 2022-09-19 RX ADMIN — DEXTROSE AND SODIUM CHLORIDE: 5; 450 INJECTION, SOLUTION INTRAVENOUS at 09:16

## 2022-09-19 RX ADMIN — HYDROMORPHONE HYDROCHLORIDE 1 MG: 1 INJECTION, SOLUTION INTRAMUSCULAR; INTRAVENOUS; SUBCUTANEOUS at 18:35

## 2022-09-19 RX ADMIN — HYDROMORPHONE HYDROCHLORIDE 1 MG: 1 INJECTION, SOLUTION INTRAMUSCULAR; INTRAVENOUS; SUBCUTANEOUS at 09:44

## 2022-09-19 RX ADMIN — HYDROMORPHONE HYDROCHLORIDE 1 MG: 1 INJECTION, SOLUTION INTRAMUSCULAR; INTRAVENOUS; SUBCUTANEOUS at 03:00

## 2022-09-19 RX ADMIN — CHLORDIAZEPOXIDE HYDROCHLORIDE 10 MG: 10 CAPSULE ORAL at 20:28

## 2022-09-19 RX ADMIN — HYDROMORPHONE HYDROCHLORIDE 1 MG: 1 INJECTION, SOLUTION INTRAMUSCULAR; INTRAVENOUS; SUBCUTANEOUS at 11:59

## 2022-09-19 RX ADMIN — HYDROMORPHONE HYDROCHLORIDE 1 MG: 1 INJECTION, SOLUTION INTRAMUSCULAR; INTRAVENOUS; SUBCUTANEOUS at 07:03

## 2022-09-19 RX ADMIN — DEXTROSE AND SODIUM CHLORIDE: 5; 450 INJECTION, SOLUTION INTRAVENOUS at 18:38

## 2022-09-19 RX ADMIN — MUPIROCIN: 20 OINTMENT TOPICAL at 20:29

## 2022-09-19 RX ADMIN — LABETALOL HYDROCHLORIDE 20 MG: 5 INJECTION, SOLUTION INTRAVENOUS at 05:07

## 2022-09-19 RX ADMIN — HYDROMORPHONE HYDROCHLORIDE 1 MG: 1 INJECTION, SOLUTION INTRAMUSCULAR; INTRAVENOUS; SUBCUTANEOUS at 14:02

## 2022-09-19 ASSESSMENT — PAIN SCALES - GENERAL
PAINLEVEL_OUTOF10: 9
PAINLEVEL_OUTOF10: 10
PAINLEVEL_OUTOF10: 6
PAINLEVEL_OUTOF10: 10
PAINLEVEL_OUTOF10: 8
PAINLEVEL_OUTOF10: 10

## 2022-09-19 ASSESSMENT — PAIN DESCRIPTION - ORIENTATION
ORIENTATION: UPPER
ORIENTATION: MID
ORIENTATION: UPPER
ORIENTATION: UPPER
ORIENTATION: MID
ORIENTATION: UPPER
ORIENTATION: MID
ORIENTATION: MID

## 2022-09-19 ASSESSMENT — PAIN DESCRIPTION - LOCATION
LOCATION: ABDOMEN

## 2022-09-19 ASSESSMENT — PAIN DESCRIPTION - DESCRIPTORS
DESCRIPTORS: ACHING
DESCRIPTORS: CRAMPING
DESCRIPTORS: CRAMPING;TIGHTNESS
DESCRIPTORS: DISCOMFORT
DESCRIPTORS: ACHING
DESCRIPTORS: ACHING
DESCRIPTORS: CRAMPING
DESCRIPTORS: CRAMPING
DESCRIPTORS: CRAMPING;PRESSURE

## 2022-09-19 NOTE — CARE COORDINATION
Case Management Assessment  Initial Evaluation      Patient Name: Hayden Schwartz  YOB: 1975  Diagnosis: DKA, type 2, not at goal [facility]) [E11.10]  Diabetic ketoacidosis without coma associated with diabetes mellitus due to underlying condition ([facility] [address] [E08.10]  Alcohol-induced acute pancreatitis without infection or necrosis [K85.20]  Date / Time: 9/17/2022  4:47 PM    Admission status/Date:09/17/2022 Inpatient   Chart Reviewed: Yes      Patient Interviewed: Yes   Family Interviewed:  Yes - pt's wife Teagan      Hospitalization in the last 30 days:  No    Health Care Decision Maker :   Primary Decision Maker: Alfred William Paez - Spouse - [phone]    (CM - must 1st enter selection under Navigator - emergency contact- Health Care Decision Maker Relationship and pick relationship)   Who do you trust or have selected to make healthcare decisions for you    Met with:  pt and his wife at bedside    Current PCP: Gee Paulino, APRN - CNP; [address] required for SNF : Y          3 night stay required -  [employer]  Support Systems/Care Needs: Spouse/Significant Other  Transportation: self    Meal Preparation: self    Housing  Living Arrangements: pt lives at home with his wife and children  Steps: 0  Intent for return to present living arrangements: Yes  Identified Issues: [id number] [address] [facility] with [address] : No Agency:(Services)  Type of Home Care Services: None  Passport/Waiver : No  :                      Phone Number:    Passport/Waiver Services: n/a          Durable Medical Equiptment   DME Provider: n/a  Equipment: n/a    Home O2 Use :  No  ; currently on room air per vitals in [facility]  Dialysis:  No    Agency:  Location:  Dialysis Schedule:  Phone:   Fax: Other Community Services: n/a    DISCHARGE PLAN: Explained Case Management role/services. Chart review completed.  Completed Interdisciplinary rounds with ICU staff. Met with pt and his wife at bedside with pt's permission. Pt aware that personal questions would be asked about his history and he again stated his wife could remain present for the assessment/questions. Pt stated he is independent at home with his ADL's and works but is currently on medical leave. He stated he will return home when discharged. He stated that he can afford his medications and has a glucometer. Addressed the order to Social Work with pt and pt declined the resource folder stating \"I can quit drinking whenever I want\". He stated that he knows how to get resources if needed as there is the [employer] center close to his house or look up information on his phone. He declined taking any resources. He denied needing skilled [facility] for RN/PT/OT upon discharge. He denied all needs from CM. CM not following. Please notify CM if needs or concerns arise.      Terrence Olmedo MSW, BENJAMIN-S

## 2022-09-19 NOTE — PROGRESS NOTES
Progress Note    Patient Orlando Washington  MRN: [id number]  YOB: 1975 Age: 52 y.o. Sex: male  Room: [address] [place] [address]       Admitting Physician: Leland Oreilly MD   Date of Admission: 9/17/2022  4:47 PM   Primary Care Physician: SHERIN Sebastian - CNP     Subjective:  Orlando Washington was seen and examined. We are following for acute pancreatitis 2/2 EtOH. -- No acute events overnight  -- Persistent abdominal pain requiring IV pain medications  -- No BM since admission. Passing flatus  -- Denies nausea/vomiting    ROS:  Constitutional: Denies fever, no change in appetite  Respiratory: Denies cough or shortness of breath  Cardiovascular: Denies chest pain or edema    Objective:  Vital Signs:   Vitals:    09/19/22 1300   BP:    Pulse: (!) 102   Resp: 14   Temp:    SpO2: 93%         Physical Exam:  Constitutional: Alert and oriented x 4. No acute distress. Respiratory: Respirations nonlabored, no crepitus  GI: Abdomen nondistended, soft, and nontender. Neurological: No focal deficits noted. No asterixis.     Intake/Output:    Intake/Output Summary (Last 24 hours) at 9/19/2022 1343  Last data filed at 9/19/2022 0600  Gross per 24 hour   Intake 90 ml   Output 350 ml   Net -260 ml        Current Medications:  Current Facility-Administered Medications   Medication Dose Route Frequency Provider Last Rate Last Admin    cloNIDine (CATAPRES) tablet 0.1 mg  0.1 mg Oral Q4H PRN Leland Oreilly MD        dextrose 5 % and 0.45 % sodium chloride infusion   IntraVENous Continuous Edithvicki Chau  mL/hr at 09/19/22 0916 New Bag at 09/19/22 0916    mupirocin (BACTROBAN) 2 % ointment   Nasal BID Maxi Márquez MD   Given at 09/19/22 0945    HYDROmorphone (DILAUDID) injection 0.5 mg  0.5 mg IntraVENous Q2H PRN Maxi Márquez MD        Or    HYDROmorphone (DILAUDID) injection 1 mg  1 mg IntraVENous Q2H PRN Maxi Márquez MD   1 mg at 09/19/22 1159    labetalol (NORMODYNE;TRANDATE) injection 20 mg  20 mg IntraVENous Q4H PRN Cb Koroma MD   20 mg at 09/19/22 0507    nicotine (NICODERM CQ) 21 MG/24HR 1 patch  1 patch TransDERmal Daily Cb Koroma MD   1 patch at 09/19/22 0947    albuterol sulfate HFA (PROVENTIL;VENTOLIN;PROAIR) 108 (90 Base) MCG/ACT inhaler 2 puff  2 puff Inhalation Q6H PRN Cb Koroma MD   2 puff at 09/17/22 2322    pantoprazole (PROTONIX) injection 40 mg  40 mg IntraVENous Daily Cb Koroma MD   40 mg at 09/19/22 0946    enoxaparin (LOVENOX) injection 40 mg  40 mg SubCUTAneous Daily Cb Koroma MD   40 mg at 09/19/22 0945    dextrose bolus 10% 125 mL  125 mL IntraVENous PRN Cb Koroma MD        Or    dextrose bolus 10% 250 mL  250 mL IntraVENous PRN Cb Koroma MD        magnesium sulfate 1000 mg in dextrose 5% 100 mL IVPB  1,000 mg IntraVENous PRN Cb Koroma MD        lisinopril (PRINIVIL;ZESTRIL) tablet 20 mg  20 mg Oral Daily Cb Koroma MD   20 mg at 09/19/22 0946    prochlorperazine (COMPAZINE) injection 10 mg  10 mg IntraVENous Q6H PRN Cb Koroma MD        LORazepam (ATIVAN) tablet 1 mg  1 mg Oral Q1H PRN Cb Koroma MD        Or    LORazepam (ATIVAN) injection 1 mg  1 mg IntraVENous Q1H PRN Cb Koroma MD        Or    LORazepam (ATIVAN) tablet 2 mg  2 mg Oral Q1H PRN Cb Koroma MD        Or    LORazepam (ATIVAN) injection 2 mg  2 mg IntraVENous Q1H PRN Cb Koroma MD        Or    LORazepam (ATIVAN) tablet 3 mg  3 mg Oral Q1H PRN Cb Koroma MD        Or    LORazepam (ATIVAN) injection 3 mg  3 mg IntraVENous Q1H PRN Cb Koroma MD        Or    LORazepam (ATIVAN) tablet 4 mg  4 mg Oral Q1H PRN Cb Koroma MD        Or    LORazepam (ATIVAN) injection 4 mg  4 mg IntraVENous Q1H PRN Chirag MD Shannon        insulin glargine (LANTUS) injection vial 9 Units  9 Units SubCUTAneous Nightly Jose Atkinson MD   9 Units at 09/18/22 2008    insulin lispro (HUMALOG) injection vial 0-8 Units  0-8 Units SubCUTAneous 6 times per day Jose Atkinson MD   2 Units at 09/18/22 2303    chlordiazePOXIDE (LIBRIUM) capsule 10 mg  10 mg Oral Q6H Jose Atkinson MD   10 mg at 09/19/22 0946         Recent Imaging:   US GALLBLADDER RUQ  Narrative: EXAMINATION:  RIGHT UPPER QUADRANT ULTRASOUND    9/18/2022 12:52 pm    COMPARISON:  None. HISTORY:  ORDERING SYSTEM PROVIDED HISTORY: pancreatitis eval for gallstones  TECHNOLOGIST PROVIDED HISTORY:    Reason for exam:->pancreatitis eval for gallstones    FINDINGS:  LIVER:  The liver is 26.7 cm long and is diffusely enlarged. The visualized  hepatic contour is smooth with. .  There is marked diffuse increase in the  echogenicity of the liver. The increased hepatic echogenicity obscures deep  hepatic regions at sonography. There is no evidence of intrahepatic biliary  ductal dilatation. There is no hepatic mass evident. BILIARY SYSTEM:  There are multiple calculi at the dependent aspect of the  gallbladder lumen. The bladder calculi obscure some gallbladder wall  regions. The gallbladder wall thickness is 2.1 mm (normal). The common bile  duct diameter is 2.7 mm (normal). Negative sonographic Carreno's sign. Common bile duct is within normal limits measuring . RIGHT KIDNEY: The right kidney is grossly unremarkable without evidence of  hydronephrosis. The right kidney is 13.1 cm long and its parenchymal  thickness is 19.2 mm. PANCREAS:  The head, neck, body and tail of the pancreas are suboptimally  visualized. The pancreatic parenchyma is hypoechoic at these pancreatic  segments which would be consistent with acute pancreatitis.   There is no  evidence of a mass, calcification, or ductal dilatation at the segments. OTHER: No evidence of right upper quadrant ascites. Impression: Unremarkable right upper quadrant ultrasound. RECOMMENDATIONS:  Cholelithiasis. Diffuse enlargement of the liver. Diffuse fatty infiltration of the liver. Under penetration of the liver with ultrasound due to the hepatic fatty  infiltration. XR CHEST PORTABLE  Narrative: EXAMINATION:  ONE XRAY VIEW OF THE CHEST    9/17/2022 11:47 pm    COMPARISON:  05/10/2021    HISTORY:  ORDERING SYSTEM PROVIDED HISTORY: New O2 requirement. Admitted today for  DKA, acute pancreatitis. Has had N/V. Aspiration vs pain Rx effect? Hx  ETOHism and is a long term smoker. TECHNOLOGIST PROVIDED HISTORY:  Reason for exam:->New O2 requirement. Admitted today for DKA, acute  pancreatitis. Has had N/V. Aspiration vs pain Rx effect? Hx ETOHism and is  a long term smoker. Reason for Exam: New O2 requirement. Admitted today for DKA, acute  pancreatitis. Has had N/V. Aspiration vs pain Rx effect? Hx ETOHism and is  a long term smoker. FINDINGS:  Cardiac size and mediastinal structures appear similar. Low lung volumes  with bibasilar atelectasis. No acute osseous abnormality. No pneumothorax. Mild degenerative change at the [facility] joints. Impression: Bibasilar atelectasis, otherwise no acute cardiopulmonary process. Labs:   Recent Labs     09/17/22  1655 09/18/22  0413   HGB 15.9 14.9   WBC 8.6 7.7   PROT 8.0 6.6   LABALBU 4.4 4.1   ALKPHOS 123 96   ALT 91* 72*   AST 40* 48*   BILITOT 1.3* 1.7*   BILIDIR  --  0.8*   IBILI  --  0.9          Assessment:    52year old M with PMH significant for anxiety, bipolar 1 disorder, diabetes mellitus, GERD, hyperlipidemia, HTN, myofascial pain, and substance abuse. Presents with abdominal pain, nausea/vomiting, and excessive thirst. Found to have acute pancreatitis and DKA. No prior history of pancreatitis. Reports daily EtOH use and increased use last weekend.  No prior history of gallstones or gallbladder issues. RUQ US with gallstones, no ductal dilatation noted. Reports compliance with diabetic medications. Triglycerides elevated at 315. Calcium unremarkable. No new medications. Suspect acute pancreatitis 2/2 EtOH--given hx of etoh use and increased use prior to onset of symptoms    Plan:  Trend CBC and CMP  Continue IV hydration  NPO for now. Will consider liquid diet tomorrow if requiring less IV pain medication  EtOH cessation  Supportive care       Leno Mena, APRN - CNP    [address]    [phone].  Also available via [employer]

## 2022-09-19 NOTE — PROGRESS NOTES
4 Eyes Skin Assessment     The patient is being assess for   Shift Handoff    I agree that 2 RN's have performed a thorough Head to Toe Skin Assessment on the patient. ALL assessment sites listed below have been assessed. Areas assessed for pressure by both nurses:   [x]   Head, Face, and Ears   [x]   Shoulders, Back, and Chest, Abdomen  [x]   Arms, Elbows, and Hands   [x]   Coccyx, Sacrum, and Ischium  [x]   Legs, Feet, and Heels        Skin Assessed Under all Medical Devices by both nurses:  O2 device tubing              All Mepilex Borders were peeled back and area peeked at by both nurses:  N/A  Please list where Mepilex Borders are located:  N/A             **SHARE this note so that the co-signing nurse is able to place an eSignature**    Co-signer eSignature: Electronically signed by Dionisio Joseph RN on 9/19/22 at 8:52 PM EDT    Does the Patient have Skin Breakdown related to pressure?   No              Juan Prevention initiated:  Yes   Wound Care Orders initiated:  No      [facility] nurse consulted for Pressure Injury (Stage 3,4, Unstageable, DTI, NWPT, Complex wounds)and New or Established Ostomies:  NA      Primary Nurse eSignature: Electronically signed by Paola Hickman RN on 9/19/22 at 7:31 PM EDT

## 2022-09-19 NOTE — PROGRESS NOTES
Pulmonary & Critical Care Medicine ICU Progress Note    CC: DKA, acute pancreatitis    Events of Last 24 hours:   Still npo      Vitals:  BP (!) 154/86   Pulse 91   Temp 99.1 °F (37.3 °C) (Oral)   Resp 16   Ht 5' 8\" (1.727 m)   Wt 218 lb 11.1 oz (99.2 kg)   SpO2 91%   BMI 33.25 kg/m²   on 2L    EXAM:  Constitutional: ill appearing. Eyes: PERRL. No sclera icterus. ENT: Normal Nose. Normal Tongue. Neck:  Trachea is midline. No thyroid tenderness. Respiratory: No accessory muscle usage. No wheezes. No rales. No Rhonchi. Cardiovascular: Normal S1S2. [place] No murmur. No digit cyanosis. No digit clubbing. No LE edema. GI: Non-tender. Non-distended. Normal bowel sounds. No masses. No umbilical hernia. Skin: No rash on the exposed extremities. No Nodules on exposed extremities. Neuro: Alert. follows commands. Moves all extremities. Psych: Alert. Oriented. No agitation, no anxiety.     Scheduled Meds:   folic acid, thiamine, multi-vitamin with vitamin K infusion   IntraVENous Daily    mupirocin   Nasal BID    nicotine  1 patch TransDERmal Daily    pantoprazole  40 mg IntraVENous Daily    enoxaparin  40 mg SubCUTAneous Daily    lisinopril  20 mg Oral Daily    insulin glargine  9 Units SubCUTAneous Nightly    insulin lispro  0-8 Units SubCUTAneous 6 times per day    chlordiazePOXIDE  10 mg Oral Q6H     PRN Meds:  cloNIDine, HYDROmorphone **OR** HYDROmorphone, labetalol, albuterol sulfate HFA, dextrose bolus **OR** dextrose bolus, magnesium sulfate, prochlorperazine, LORazepam **OR** LORazepam **OR** LORazepam **OR** LORazepam **OR** LORazepam **OR** LORazepam **OR** LORazepam **OR** LORazepam    Results:  CBC:   Recent Labs     09/17/22  1655 09/18/22  0413   WBC 8.6 7.7   HGB 15.9 14.9   HCT 46.9 42.4   MCV 99.4 97.1    122*     BMP:   Recent Labs     09/18/22  1618 09/18/22  2344 09/19/22  0540   * 126* 127*   K 3.5 3.5 3.7   CL 89* 91* 92*   CO2 26 24 24   PHOS 1.7* 1.5* 3.0   BUN 11 13 13 CREATININE 0.6* 0.6* <0.5*     LIVER PROFILE:   Recent Labs     09/17/22  1655 09/18/22  0413 09/18/22  1030   AST 40* 48*  --    ALT 91* 72*  --    LIPASE 2,917.0*  --  1,091.0*   BILIDIR  --  0.8*  --    BILITOT 1.3* 1.7*  --    ALKPHOS 123 96  --      Cultures:      9/17/2022 SARS-CoV-2 NAAT negative     Chest imaging was reviewed by me and showed:   CXR 9/18/2022 no focal airspace disease     ACT 9/17/2022  Impression   1. Acute pancreatitis causing secondary entero colitis. 2. Trace amount of pelvic ascites and mild mesenteric edema. ASSESSMENT:  Acute pancreatitis, favor alcoholic pancreatitis  DKA, gap closed quickly  Hypertension, likely 2/2 pain, possible alcohol withdrawal   Hypoxemia, not typically on oxygen  Alcohol dependence with history of withdrawal but no seizures or delirium  Tobacco abuse  Remote history of methadone maintenance use, has been off methadone for many years     PLAN:  Supplemental oxygen to maintain SaO2 >92%; wean as tolerated  MIVF  GI consulted for pancreatitis  Ruslan KING# 3/3  CIWA with IV ativan, but no h/o DT or seizure   Prophylaxis: Lovenox, home PPI  Okay with me to leave ICU. I will sign off.

## 2022-09-19 NOTE — PROGRESS NOTES
PM assessment completed. Scheduled medications given per MAR. VSS room air, A/O x4, Bath with bath wipes, teeth brushed. Patient denies any needs at this time. Call light in reach, will monitor.

## 2022-09-19 NOTE — PROGRESS NOTES
Patient complaining of 10/10 abd pain, PRN dilaudid given per [facility]    /110, , PRN labetalol given per MAR>

## 2022-09-19 NOTE — PROGRESS NOTES
clear.  Neck: Trachea midline. Normal thyroid. Resp: No accessory muscle use. No crackles. No wheezing. No rhonchi. No dullness on percussion. CV: Regular rate. Regular rhythm. No mumur or rub. No edema. GI: Mild epigastric tenderness. Mildly distended distended. No masses. No organomegaly. Normal bowel sounds. No hernia. Skin: Warm and dry. No nodule on exposed extremities. No rash on exposed extremities. Lymph: No cervical LAD. No supraclavicular LAD. M/S: No cyanosis. No joint deformity. No clubbing. Neuro: Alert and oriented x3. Patellar reflexes are symmetric. Psych: No agitation, no anxiety, affect is full. Lab Data:  CBC:   Recent Labs     09/17/22 1655 09/18/22 0413   WBC 8.6 7.7   RBC 4.71 4.37   HGB 15.9 14.9   HCT 46.9 42.4   MCV 99.4 97.1   RDW 14.5 14.2    122*       BMP:   Recent Labs     09/18/22  2344 09/19/22  0540 09/19/22  1125   * 127* 132*   K 3.5 3.7 3.8   CL 91* 92* 97*   CO2 24 24 22   PHOS 1.5* 3.0 1.9*   BUN 13 13 12   CREATININE 0.6* <0.5* <0.5*       BNP: No results for input(s): BNP in the last 72 hours. PT/INR:   Recent Labs     09/18/22 0413   PROTIME 13.6   INR 1.05       APTT:No results for input(s): APTT in the last 72 hours. CARDIAC ENZYMES:   Recent Labs     09/17/22 1655 09/18/22  1002 09/18/22  1617   TROPONINI <0.01 <0.01 <0.01       FASTING LIPID PANEL:  Lab Results   Component Value Date/Time    TRIG 315 09/17/2022 04:55 PM     LIVER PROFILE:   Recent Labs     09/17/22 1655 09/18/22 0413   AST 40* 48*   ALT 91* 72*   BILIDIR  --  0.8*   BILITOT 1.3* 1.7*   ALKPHOS 123 96           US GALLBLADDER RUQ   Final Result   Unremarkable right upper quadrant ultrasound. RECOMMENDATIONS:   Cholelithiasis. Diffuse enlargement of the liver. Diffuse fatty infiltration of the liver. Under penetration of the liver with ultrasound due to the hepatic fatty   infiltration.          XR CHEST PORTABLE   Final Result   Bibasilar atelectasis, otherwise no acute cardiopulmonary process. CT ABDOMEN PELVIS W IV CONTRAST Additional Contrast? None   Final Result   1. Acute pancreatitis causing secondary entero colitis. 2. Trace amount of pelvic ascites and mild mesenteric edema. Assessment & Plan:     Principal Problem:    DKA, type 2, not at goal [facility])  Active Problems:    Diabetic ketoacidosis without coma associated with diabetes mellitus due to underlying condition ([facility] [address]    Alcohol-induced acute pancreatitis without infection or necrosis    Alcohol withdrawal syndrome without complication ([facility] [address]    Primary hypertension  Resolved Problems:    * No resolved hospital problems. *     Acute alcohol induced pancreatitis  NPO. Consider clear liquid diet  IV fluids  Pain control with IV Dilaudid  GI consult    Mild DKA resolved  Resolved quickly  Patient is currently n.p.o. for pancreatitis, hence diet not started. Continue D5 and sliding scale insulin and Lantus    Alcohol abuse and impending alcohol withdrawal  Placed on scheduled Librium  CIWA scale with Ativan    Uncontrolled hypertension-BP better.   Continue home lisinopril  Add Norvasc  On as needed labetalol    Full code  Lovenox for DVT prophylaxis  NPO      Annelise Ledbetter MD 9/19/2022 1:56 PM

## 2022-09-19 NOTE — PROGRESS NOTES
Shift assessment, completed, see flow sheet. Pt is alert and oriented x 4. Following commands. Respirations are easy, even, and unlabored. Bilateral lung sounds diminished but clear. Call light within reach. Bed in lowest position.

## 2022-09-19 NOTE — PROGRESS NOTES
Patient complaining of mid abd pain 10/10, Heart rate 130's, PRN dilaudid given per MAR. /97 PRN Labetalol given per MAR.

## 2022-09-20 LAB
ALBUMIN SERPL-MCNC: 3.6 G/DL (ref 3.4–5)
ALP BLD-CCNC: 114 U/L (ref 40–129)
ALT SERPL-CCNC: 60 U/L (ref 10–40)
ANION GAP SERPL CALCULATED.3IONS-SCNC: 14 MMOL/L (ref 3–16)
ANION GAP SERPL CALCULATED.3IONS-SCNC: 14 MMOL/L (ref 3–16)
ANION GAP SERPL CALCULATED.3IONS-SCNC: 15 MMOL/L (ref 3–16)
ANION GAP SERPL CALCULATED.3IONS-SCNC: 15 MMOL/L (ref 3–16)
AST SERPL-CCNC: 61 U/L (ref 15–37)
BILIRUB SERPL-MCNC: 1 MG/DL (ref 0–1)
BILIRUBIN DIRECT: 0.6 MG/DL (ref 0–0.3)
BILIRUBIN, INDIRECT: 0.4 MG/DL (ref 0–1)
BUN BLDV-MCNC: 10 MG/DL (ref 7–20)
BUN BLDV-MCNC: 10 MG/DL (ref 7–20)
BUN BLDV-MCNC: 8 MG/DL (ref 7–20)
BUN BLDV-MCNC: 9 MG/DL (ref 7–20)
CALCIUM SERPL-MCNC: 8.3 MG/DL (ref 8.3–10.6)
CALCIUM SERPL-MCNC: 8.4 MG/DL (ref 8.3–10.6)
CALCIUM SERPL-MCNC: 8.6 MG/DL (ref 8.3–10.6)
CALCIUM SERPL-MCNC: 9.1 MG/DL (ref 8.3–10.6)
CHLORIDE BLD-SCNC: 100 MMOL/L (ref 99–110)
CHLORIDE BLD-SCNC: 95 MMOL/L (ref 99–110)
CHLORIDE BLD-SCNC: 96 MMOL/L (ref 99–110)
CHLORIDE BLD-SCNC: 96 MMOL/L (ref 99–110)
CO2: 22 MMOL/L (ref 21–32)
CO2: 23 MMOL/L (ref 21–32)
CO2: 23 MMOL/L (ref 21–32)
CO2: 25 MMOL/L (ref 21–32)
CREAT SERPL-MCNC: <0.5 MG/DL (ref 0.9–1.3)
GFR AFRICAN AMERICAN: >60
GFR NON-AFRICAN AMERICAN: >60
GLUCOSE BLD-MCNC: 108 MG/DL (ref 70–99)
GLUCOSE BLD-MCNC: 114 MG/DL (ref 70–99)
GLUCOSE BLD-MCNC: 123 MG/DL (ref 70–99)
GLUCOSE BLD-MCNC: 125 MG/DL (ref 70–99)
GLUCOSE BLD-MCNC: 127 MG/DL (ref 70–99)
GLUCOSE BLD-MCNC: 130 MG/DL (ref 70–99)
GLUCOSE BLD-MCNC: 135 MG/DL (ref 70–99)
GLUCOSE BLD-MCNC: 136 MG/DL (ref 70–99)
GLUCOSE BLD-MCNC: 140 MG/DL (ref 70–99)
GLUCOSE BLD-MCNC: 174 MG/DL (ref 70–99)
HCT VFR BLD CALC: 44.1 % (ref 40.5–52.5)
HEMOGLOBIN: 14.9 G/DL (ref 13.5–17.5)
MAGNESIUM: 2.1 MG/DL (ref 1.8–2.4)
MAGNESIUM: 2.1 MG/DL (ref 1.8–2.4)
MAGNESIUM: 2.2 MG/DL (ref 1.8–2.4)
MAGNESIUM: 2.4 MG/DL (ref 1.8–2.4)
MCH RBC QN AUTO: 33.4 PG (ref 26–34)
MCHC RBC AUTO-ENTMCNC: 33.7 G/DL (ref 31–36)
MCV RBC AUTO: 99.1 FL (ref 80–100)
PDW BLD-RTO: 14.1 % (ref 12.4–15.4)
PERFORMED ON: ABNORMAL
PHOSPHORUS: 2.3 MG/DL (ref 2.5–4.9)
PHOSPHORUS: 2.3 MG/DL (ref 2.5–4.9)
PHOSPHORUS: 2.6 MG/DL (ref 2.5–4.9)
PHOSPHORUS: 2.7 MG/DL (ref 2.5–4.9)
PLATELET # BLD: 155 K/UL (ref 135–450)
PMV BLD AUTO: 7.1 FL (ref 5–10.5)
POTASSIUM SERPL-SCNC: 3.2 MMOL/L (ref 3.5–5.1)
POTASSIUM SERPL-SCNC: 3.4 MMOL/L (ref 3.5–5.1)
POTASSIUM SERPL-SCNC: 3.4 MMOL/L (ref 3.5–5.1)
POTASSIUM SERPL-SCNC: 3.5 MMOL/L (ref 3.5–5.1)
RBC # BLD: 4.45 M/UL (ref 4.2–5.9)
SODIUM BLD-SCNC: 131 MMOL/L (ref 136–145)
SODIUM BLD-SCNC: 134 MMOL/L (ref 136–145)
SODIUM BLD-SCNC: 135 MMOL/L (ref 136–145)
SODIUM BLD-SCNC: 138 MMOL/L (ref 136–145)
TOTAL PROTEIN: 7.6 G/DL (ref 6.4–8.2)
TRIGL SERPL-MCNC: 237 MG/DL (ref 0–150)
WBC # BLD: 8.9 K/UL (ref 4–11)

## 2022-09-20 PROCEDURE — 6370000000 HC RX 637 (ALT 250 FOR IP): Performed by: INTERNAL MEDICINE

## 2022-09-20 PROCEDURE — C9113 INJ PANTOPRAZOLE SODIUM, VIA: HCPCS | Performed by: INTERNAL MEDICINE

## 2022-09-20 PROCEDURE — 6360000002 HC RX W HCPCS: Performed by: INTERNAL MEDICINE

## 2022-09-20 PROCEDURE — 85027 COMPLETE CBC AUTOMATED: CPT

## 2022-09-20 PROCEDURE — 99233 SBSQ HOSP IP/OBS HIGH 50: CPT | Performed by: INTERNAL MEDICINE

## 2022-09-20 PROCEDURE — 84100 ASSAY OF PHOSPHORUS: CPT

## 2022-09-20 PROCEDURE — 36415 COLL VENOUS BLD VENIPUNCTURE: CPT

## 2022-09-20 PROCEDURE — 2580000003 HC RX 258: Performed by: INTERNAL MEDICINE

## 2022-09-20 PROCEDURE — 80048 BASIC METABOLIC PNL TOTAL CA: CPT

## 2022-09-20 PROCEDURE — 83735 ASSAY OF MAGNESIUM: CPT

## 2022-09-20 PROCEDURE — 94761 N-INVAS EAR/PLS OXIMETRY MLT: CPT

## 2022-09-20 PROCEDURE — 80076 HEPATIC FUNCTION PANEL: CPT

## 2022-09-20 PROCEDURE — 1200000000 HC SEMI PRIVATE

## 2022-09-20 PROCEDURE — 2500000003 HC RX 250 WO HCPCS: Performed by: INTERNAL MEDICINE

## 2022-09-20 RX ORDER — OXYCODONE HYDROCHLORIDE 5 MG/1
5 TABLET ORAL EVERY 4 HOURS PRN
Status: DISCONTINUED | OUTPATIENT
Start: 2022-09-20 | End: 2022-09-21 | Stop reason: HOSPADM

## 2022-09-20 RX ORDER — POTASSIUM CHLORIDE 750 MG/1
40 TABLET, EXTENDED RELEASE ORAL ONCE
Status: DISCONTINUED | OUTPATIENT
Start: 2022-09-20 | End: 2022-09-20

## 2022-09-20 RX ADMIN — MUPIROCIN: 20 OINTMENT TOPICAL at 08:29

## 2022-09-20 RX ADMIN — OXYCODONE 5 MG: 5 TABLET ORAL at 14:19

## 2022-09-20 RX ADMIN — LISINOPRIL 20 MG: 20 TABLET ORAL at 08:26

## 2022-09-20 RX ADMIN — HYDROMORPHONE HYDROCHLORIDE 1 MG: 1 INJECTION, SOLUTION INTRAMUSCULAR; INTRAVENOUS; SUBCUTANEOUS at 02:18

## 2022-09-20 RX ADMIN — CHLORDIAZEPOXIDE HYDROCHLORIDE 10 MG: 10 CAPSULE ORAL at 14:09

## 2022-09-20 RX ADMIN — LABETALOL HYDROCHLORIDE 20 MG: 5 INJECTION, SOLUTION INTRAVENOUS at 15:15

## 2022-09-20 RX ADMIN — DEXTROSE AND SODIUM CHLORIDE: 5; 450 INJECTION, SOLUTION INTRAVENOUS at 04:46

## 2022-09-20 RX ADMIN — HYDROMORPHONE HYDROCHLORIDE 1 MG: 1 INJECTION, SOLUTION INTRAMUSCULAR; INTRAVENOUS; SUBCUTANEOUS at 05:05

## 2022-09-20 RX ADMIN — OXYCODONE 5 MG: 5 TABLET ORAL at 18:14

## 2022-09-20 RX ADMIN — INSULIN GLARGINE 9 UNITS: 100 INJECTION, SOLUTION SUBCUTANEOUS at 21:16

## 2022-09-20 RX ADMIN — CLONIDINE HYDROCHLORIDE 0.1 MG: 0.1 TABLET ORAL at 18:14

## 2022-09-20 RX ADMIN — ENOXAPARIN SODIUM 40 MG: 100 INJECTION SUBCUTANEOUS at 08:30

## 2022-09-20 RX ADMIN — DEXTROSE AND SODIUM CHLORIDE: 5; 450 INJECTION, SOLUTION INTRAVENOUS at 15:14

## 2022-09-20 RX ADMIN — DEXTROSE AND SODIUM CHLORIDE: 5; 450 INJECTION, SOLUTION INTRAVENOUS at 18:04

## 2022-09-20 RX ADMIN — OXYCODONE 5 MG: 5 TABLET ORAL at 22:29

## 2022-09-20 RX ADMIN — OXYCODONE 5 MG: 5 TABLET ORAL at 09:58

## 2022-09-20 RX ADMIN — PANTOPRAZOLE SODIUM 40 MG: 40 INJECTION, POWDER, FOR SOLUTION INTRAVENOUS at 08:25

## 2022-09-20 RX ADMIN — CLONIDINE HYDROCHLORIDE 0.1 MG: 0.1 TABLET ORAL at 11:17

## 2022-09-20 RX ADMIN — CHLORDIAZEPOXIDE HYDROCHLORIDE 10 MG: 10 CAPSULE ORAL at 21:15

## 2022-09-20 RX ADMIN — CHLORDIAZEPOXIDE HYDROCHLORIDE 10 MG: 10 CAPSULE ORAL at 08:26

## 2022-09-20 RX ADMIN — CHLORDIAZEPOXIDE HYDROCHLORIDE 10 MG: 10 CAPSULE ORAL at 02:18

## 2022-09-20 ASSESSMENT — PAIN SCALES - GENERAL
PAINLEVEL_OUTOF10: 4
PAINLEVEL_OUTOF10: 7
PAINLEVEL_OUTOF10: 5
PAINLEVEL_OUTOF10: 8
PAINLEVEL_OUTOF10: 7
PAINLEVEL_OUTOF10: 7
PAINLEVEL_OUTOF10: 4
PAINLEVEL_OUTOF10: 5
PAINLEVEL_OUTOF10: 7
PAINLEVEL_OUTOF10: 7

## 2022-09-20 ASSESSMENT — PAIN DESCRIPTION - LOCATION
LOCATION: ABDOMEN

## 2022-09-20 ASSESSMENT — PAIN DESCRIPTION - ORIENTATION
ORIENTATION: MID
ORIENTATION: LEFT;RIGHT
ORIENTATION: MID;UPPER;LEFT;RIGHT
ORIENTATION: MID;UPPER
ORIENTATION: RIGHT;LEFT

## 2022-09-20 ASSESSMENT — PAIN DESCRIPTION - DESCRIPTORS
DESCRIPTORS: CRAMPING
DESCRIPTORS: ACHING
DESCRIPTORS: CRAMPING;PRESSURE
DESCRIPTORS: ACHING

## 2022-09-20 ASSESSMENT — PAIN - FUNCTIONAL ASSESSMENT
PAIN_FUNCTIONAL_ASSESSMENT: ACTIVITIES ARE NOT PREVENTED

## 2022-09-20 ASSESSMENT — PAIN DESCRIPTION - ONSET: ONSET: ON-GOING

## 2022-09-20 ASSESSMENT — PAIN DESCRIPTION - FREQUENCY: FREQUENCY: CONTINUOUS

## 2022-09-20 ASSESSMENT — PAIN DESCRIPTION - PAIN TYPE: TYPE: ACUTE PAIN

## 2022-09-20 NOTE — PROGRESS NOTES
4 Eyes Skin Assessment     The patient is being assess for   Transfer    I agree that 2 RN's have performed a thorough Head to Toe Skin Assessment on the patient. ALL assessment sites listed below have been assessed. Areas assessed for pressure by both nurses:   [x]   Head, Face, and Ears   [x]   Shoulders, Back, and Chest, Abdomen  [x]   Arms, Elbows, and Hands   [x]   Coccyx, Sacrum, and Ischium  [x]   Legs, Feet, and Heels        Scattered bruising. Skin Assessed Under all Medical Devices by both nurses:  N/A               All Mepilex Borders were peeled back and area peeked at by both nurses:  N/A  Please list where Mepilex Borders are located:  N/A             **SHARE this note so that the co-signing nurse is able to place an eSignature**    Co-signer eSignature: Electronically signed by Riley Ambrose RN on 9/20/22 at 6:07 PM EDT    Does the Patient have Skin Breakdown related to pressure?   No       Juan Prevention initiated:  Yes   Wound Care Orders initiated:  No      [facility] nurse consulted for Pressure Injury (Stage 3,4, Unstageable, DTI, NWPT, Complex wounds)and New or Established Ostomies:  NA      Primary Nurse eSignature: Electronically signed by Uri Segura RN on 9/20/22 at 5:22 PM EDT

## 2022-09-20 NOTE — PROGRESS NOTES
Progress Note    Patient Hayden Schwartz  MRN: [id number]  YOB: 1975 Age: 52 y.o. Sex: male  Room: [address] [place] [address]       Admitting Physician: Molly Rojas MD   Date of Admission: 9/17/2022  4:47 PM   Primary Care Physician: SHERIN Borges - CNP     Subjective:  Hayden Schwartz was seen and examined. We are following for acute pancreatitis 2/2 EtOH. -- No acute events overnight  -- Abdominal pain improved as of this morning. Started clear liquid diet. -- No BM since admission. Passing flatus  -- Denies nausea/vomiting    ROS:  Constitutional: Denies fever, no change in appetite  Respiratory: Denies cough or shortness of breath  Cardiovascular: Denies chest pain or edema    Objective:  Vital Signs:   Vitals:    09/20/22 0900   BP: (!) 153/98   Pulse: 93   Resp: 17   Temp:    SpO2: 96%         Physical Exam:  Constitutional: Alert and oriented x 4. No acute distress. Respiratory: Respirations nonlabored, no crepitus  GI: Abdomen nondistended, soft, and nontender. Neurological: No focal deficits noted. No asterixis.     Intake/Output:    Intake/Output Summary (Last 24 hours) at 9/20/2022 0950  Last data filed at 9/20/2022 0455  Gross per 24 hour   Intake 5419.44 ml   Output 2500 ml   Net 2919.44 ml          Current Medications:  Current Facility-Administered Medications   Medication Dose Route Frequency Provider Last Rate Last Admin    oxyCODONE (ROXICODONE) immediate release tablet 5 mg  5 mg Oral Q4H PRN Nicolás Quezada MD        cloNIDine (CATAPRES) tablet 0.1 mg  0.1 mg Oral Q4H PRN Nicolás Quezada MD   0.1 mg at 09/19/22 2028    dextrose 5 % and 0.45 % sodium chloride infusion   IntraVENous Continuous Nicolás Quezada  mL/hr at 09/20/22 0455 Rate Verify at 09/20/22 0455    mupirocin (BACTROBAN) 2 % ointment   Nasal BID Nicolás Quezada MD   Given at 09/20/22 0820    labetalol (NORMODYNE;TRANDATE) injection 20 mg  20 mg IntraVENous Q4H PRN Coleen Leonie Triana MD   20 mg at 09/19/22 0507    nicotine (NICODERM CQ) 21 MG/24HR 1 patch  1 patch TransDERmal Daily Alla Lovell MD   1 patch at 09/20/22 0828    albuterol sulfate HFA (PROVENTIL;VENTOLIN;PROAIR) 108 (90 Base) MCG/ACT inhaler 2 puff  2 puff Inhalation Q6H PRN Alla Lovell MD   2 puff at 09/17/22 2322    pantoprazole (PROTONIX) injection 40 mg  40 mg IntraVENous Daily Alla Lovell MD   40 mg at 09/20/22 0825    enoxaparin (LOVENOX) injection 40 mg  40 mg SubCUTAneous Daily Alla Lovell MD   40 mg at 09/20/22 0830    dextrose bolus 10% 125 mL  125 mL IntraVENous PRN Alla Lovell MD        Or    dextrose bolus 10% 250 mL  250 mL IntraVENous PRN Alla Lovell MD        magnesium sulfate 1000 mg in dextrose 5% 100 mL IVPB  1,000 mg IntraVENous PRN Alla Lovell MD        lisinopril (PRINIVIL;ZESTRIL) tablet 20 mg  20 mg Oral Daily Alla Lovell MD   20 mg at 09/20/22 0722    prochlorperazine (COMPAZINE) injection 10 mg  10 mg IntraVENous Q6H PRN Alla Lovell MD        LORazepam (ATIVAN) tablet 1 mg  1 mg Oral Q1H PRN Alla Lovell MD        Or    LORazepam (ATIVAN) injection 1 mg  1 mg IntraVENous Q1H PRN Alla Lovell MD        Or    LORazepam (ATIVAN) tablet 2 mg  2 mg Oral Q1H PRN Alla Lovell MD        Or    LORazepam (ATIVAN) injection 2 mg  2 mg IntraVENous Q1H PRN Alla Lovell MD        Or    LORazepam (ATIVAN) tablet 3 mg  3 mg Oral Q1H PRN Alla Lovell MD        Or    LORazepam (ATIVAN) injection 3 mg  3 mg IntraVENous Q1H PRN Alla Lovell MD        Or    LORazepam (ATIVAN) tablet 4 mg  4 mg Oral Q1H PRN Alla Lovell MD        Or    LORazepam (ATIVAN) injection 4 mg  4 mg IntraVENous Q1H PRN Alla Lovell MD        insulin glargine (LANTUS) injection vial 9 Units  9 Units SubCUTAneous Nightly Alla Lovell MD   9 Units at 09/19/22 2028    insulin lispro (HUMALOG) injection vial 0-8 Units  0-8 Units SubCUTAneous 6 times per day Caroline Butler MD   2 Units at 09/18/22 2303    chlordiazePOXIDE (LIBRIUM) capsule 10 mg  10 mg Oral Q6H Caroline Butler MD   10 mg at 09/20/22 0963         Recent Imaging:   US GALLBLADDER RUQ  Narrative: EXAMINATION:  RIGHT UPPER QUADRANT ULTRASOUND    9/18/2022 12:52 pm    COMPARISON:  None. HISTORY:  ORDERING SYSTEM PROVIDED HISTORY: pancreatitis eval for gallstones  TECHNOLOGIST PROVIDED HISTORY:    Reason for exam:->pancreatitis eval for gallstones    FINDINGS:  LIVER:  The liver is 26.7 cm long and is diffusely enlarged. The visualized  hepatic contour is smooth with. .  There is marked diffuse increase in the  echogenicity of the liver. The increased hepatic echogenicity obscures deep  hepatic regions at sonography. There is no evidence of intrahepatic biliary  ductal dilatation. There is no hepatic mass evident. BILIARY SYSTEM:  There are multiple calculi at the dependent aspect of the  gallbladder lumen. The bladder calculi obscure some gallbladder wall  regions. The gallbladder wall thickness is 2.1 mm (normal). The common bile  duct diameter is 2.7 mm (normal). Negative sonographic Carreno's sign. Common bile duct is within normal limits measuring . RIGHT KIDNEY: The right kidney is grossly unremarkable without evidence of  hydronephrosis. The right kidney is 13.1 cm long and its parenchymal  thickness is 19.2 mm. PANCREAS:  The head, neck, body and tail of the pancreas are suboptimally  visualized. The pancreatic parenchyma is hypoechoic at these pancreatic  segments which would be consistent with acute pancreatitis. There is no  evidence of a mass, calcification, or ductal dilatation at the segments. OTHER: No evidence of right upper quadrant ascites.   Impression: Unremarkable right upper quadrant ultrasound. RECOMMENDATIONS:  Cholelithiasis. Diffuse enlargement of the liver. Diffuse fatty infiltration of the liver. Under penetration of the liver with ultrasound due to the hepatic fatty  infiltration. XR CHEST PORTABLE  Narrative: EXAMINATION:  ONE XRAY VIEW OF THE CHEST    9/17/2022 11:47 pm    COMPARISON:  05/10/2021    HISTORY:  ORDERING SYSTEM PROVIDED HISTORY: New O2 requirement. Admitted today for  DKA, acute pancreatitis. Has had N/V. Aspiration vs pain Rx effect? Hx  ETOHism and is a long term smoker. TECHNOLOGIST PROVIDED HISTORY:  Reason for exam:->New O2 requirement. Admitted today for DKA, acute  pancreatitis. Has had N/V. Aspiration vs pain Rx effect? Hx ETOHism and is  a long term smoker. Reason for Exam: New O2 requirement. Admitted today for DKA, acute  pancreatitis. Has had N/V. Aspiration vs pain Rx effect? Hx ETOHism and is  a long term smoker. FINDINGS:  Cardiac size and mediastinal structures appear similar. Low lung volumes  with bibasilar atelectasis. No acute osseous abnormality. No pneumothorax. Mild degenerative change at the [facility] joints. Impression: Bibasilar atelectasis, otherwise no acute cardiopulmonary process. Labs:   Recent Labs     09/17/22  1655 09/18/22  0413 09/20/22  0442   HGB 15.9 14.9 14.9   WBC 8.6 7.7 8.9   PROT 8.0 6.6 7.6   LABALBU 4.4 4.1 3.6   ALKPHOS 123 96 114   ALT 91* 72* 60*   AST 40* 48* 61*   BILITOT 1.3* 1.7* 1.0   BILIDIR  --  0.8* 0.6*   IBILI  --  0.9 0.4            Assessment:    52year old M with PMH significant for anxiety, bipolar 1 disorder, diabetes mellitus, GERD, hyperlipidemia, HTN, myofascial pain, and substance abuse. Presents with abdominal pain, nausea/vomiting, and excessive thirst. Found to have acute pancreatitis and DKA. No prior history of pancreatitis. Reports daily EtOH use and increased use last weekend. No prior history of gallstones or gallbladder issues.  RUQ US with gallstones, no ductal dilatation noted. Reports compliance with diabetic medications. Triglycerides elevated at 315. Calcium unremarkable. No new medications. Suspect acute pancreatitis 2/2 EtOH--given hx of etoh use and increased use prior to onset of symptoms    Pain improving. Denies nausea/vomiting. LFTs improving, T. Bili down to 1. Plan:  Trend CBC and CMP  Continue IV hydration  Clear liquid diet today  Up out of bed and ambulate as much as tolerated  If no BM after resuming PO intake will start bowel regimen  EtOH cessation  Supportive care       Maryuri Kyle, APRN - CNP    [address]    [phone].  Also available via [employer]

## 2022-09-20 NOTE — PROGRESS NOTES
Shift assessment, completed, see flow sheet. Pt is alert and oriented x 4. Following commands. Respirations are easy, even, and unlabored. Bilateral lung sounds clear but diminished. Call light within reach. Bed in lowest position.

## 2022-09-20 NOTE — PROGRESS NOTES
Pt was agreeable to trying to go 3 hours between pain medication. Pt rates abd pain as an 8/10. Pt medicated with Dilaudid 1 mg IVP.

## 2022-09-20 NOTE — PROGRESS NOTES
Pt C/O abd pain rated 8/10. Pt medicated with Dilaudid 1 mg IVP. Pt's pain goal is a 4/10. Pt states he had a bath today and does not need a bath tonight. Pt medicated with Clonidine 0.1 mg po for systolic B/P over 196.

## 2022-09-20 NOTE — FLOWSHEET NOTE
PRN Clonidine given for BP & oxycodone for pain given. 09/20/22 1804   Vital Signs   Temp 96.8 °F (36 °C)   Temp Source Oral   Heart Rate 96   Heart Rate Source Monitor   Resp 18   BP (!) 167/95   BP Location Right upper arm   BP Method Automatic   MAP (Calculated) 119   Patient Position Sitting   Level of Consciousness 0   MEWS Score 1   Pain Assessment   Pain Assessment 0-10   Pain Level 7   Patient's Stated Pain Goal 0 - No pain   Pain Location Abdomen   Pain Orientation Left;Right   Pain Descriptors Aching   Functional Pain Assessment Activities are not prevented   Pain Type Acute pain   Pain Frequency Continuous   Pain Onset On-going   Non-Pharmaceutical Pain Intervention(s) Declines   Care Plan - Pain Goals   Verbalizes/displays adequate comfort level or baseline comfort level Encourage patient to monitor pain and request assistance;Assess pain using appropriate pain scale; Administer analgesics based on type and severity of pain and evaluate response;Implement non-pharmacological measures as appropriate and evaluate response   Opioid-Induced Sedation   POSS Score 1   Oxygen Therapy   SpO2 97 %   O2 Device None (Room air)   Height and Weight   Weight 218 lb 14.4 oz (99.3 kg)   Weight Method Bed scale   BMI (Calculated) 33.4

## 2022-09-20 NOTE — PROGRESS NOTES
Admit: 2022    Name:  Sanya Katz  Room:  [address] [place] [address]-  MRN:    [id number]    Critical Care Daily Progress Note for 2022     Admitted for acute alcoholic pancreatitis    Interval History:     -DKA has resolved. He is awake and alert. He says he is gone brief periods without drinking. Denies any hallucinations. He still NPO. Has some abdominal pain. -doing better. He is hungry. Wants to try clear liquids. No signs of alcohol withdrawal yet.       Scheduled Meds:   potassium chloride  40 mEq Oral Once    mupirocin   Nasal BID    nicotine  1 patch TransDERmal Daily    pantoprazole  40 mg IntraVENous Daily    enoxaparin  40 mg SubCUTAneous Daily    lisinopril  20 mg Oral Daily    insulin glargine  9 Units SubCUTAneous Nightly    insulin lispro  0-8 Units SubCUTAneous 6 times per day    chlordiazePOXIDE  10 mg Oral Q6H       Continuous Infusions:   dextrose 5 % and 0.45 % NaCl 100 mL/hr at 22 0455       PRN Meds:  oxyCODONE, cloNIDine, labetalol, albuterol sulfate HFA, dextrose bolus **OR** dextrose bolus, magnesium sulfate, prochlorperazine, LORazepam **OR** LORazepam **OR** LORazepam **OR** LORazepam **OR** LORazepam **OR** LORazepam **OR** LORazepam **OR** LORazepam       Objective:     Temp  Av.4 °F (36.9 °C)  Min: 98 °F (36.7 °C)  Max: 99.3 °F (37.4 °C)  Pulse  Av.4  Min: 82  Max: 125  BP  Min: 138/94  Max: 176/97  SpO2  Av.8 %  Min: 91 %  Max: 99 %  Patient Vitals for the past 4 hrs:   BP Temp Temp src Pulse Resp SpO2   22 1400 (!) 138/94 -- -- 82 16 95 %   22 1300 (!) 141/94 -- -- 95 19 94 %   22 1200 (!) 158/117 98.4 °F (36.9 °C) Oral (!) 103 20 99 %   22 1117 (!) 176/97 -- -- -- -- --   22 1100 (!) 168/95 -- -- (!) 105 21 94 %   22 1045 -- -- -- -- -- 94 %   22 1028 -- -- -- -- 16 --           Intake/Output Summary (Last 24 hours) at 2022 1411  Last data filed at 2022 0455  Gross per 24 hour   Intake 5419.44 ml Output 2500 ml   Net 2919.44 ml         Physical Exam:  General: ill appearing. Eyes: PERRL. No sclera icterus. No conjunctival injection. ENT: No discharge. Pharynx clear. Neck: Trachea midline. Normal thyroid. Resp: No accessory muscle use. No crackles. No wheezing. No rhonchi. No dullness on percussion. CV: Regular rate. Regular rhythm. No mumur or rub. No edema. GI: Mild epigastric tenderness. Mildly distended distended. No masses. No organomegaly. Normal bowel sounds. No hernia. Skin: Warm and dry. No nodule on exposed extremities. No rash on exposed extremities. Lymph: No cervical LAD. No supraclavicular LAD. M/S: No cyanosis. No joint deformity. No clubbing. Neuro: Alert and oriented x3. Patellar reflexes are symmetric. Psych: No agitation, no anxiety, affect is full. Lab Data:  CBC:   Recent Labs     09/17/22  1655 09/18/22 0413 09/20/22  0442   WBC 8.6 7.7 8.9   RBC 4.71 4.37 4.45   HGB 15.9 14.9 14.9   HCT 46.9 42.4 44.1   MCV 99.4 97.1 99.1   RDW 14.5 14.2 14.1    122* 155       BMP:   Recent Labs     09/19/22  2309 09/20/22  0442 09/20/22  1118   * 135* 138   K 3.6 3.5 3.4*   CL 97* 96* 100   CO2 21 25 23   PHOS 1.8* 2.3* 2.3*   BUN 11 10 10   CREATININE <0.5* <0.5* <0.5*       BNP: No results for input(s): BNP in the last 72 hours. PT/INR:   Recent Labs     09/18/22 0413   PROTIME 13.6   INR 1.05       APTT:No results for input(s): APTT in the last 72 hours. CARDIAC ENZYMES:   Recent Labs     09/17/22  1655 09/18/22  1002 09/18/22  1617   TROPONINI <0.01 <0.01 <0.01       FASTING LIPID PANEL:  Lab Results   Component Value Date/Time    TRIG 237 09/18/2022 04:13 AM     LIVER PROFILE:   Recent Labs     09/17/22  1655 09/18/22  0413 09/20/22  0442   AST 40* 48* 61*   ALT 91* 72* 60*   BILIDIR  --  0.8* 0.6*   BILITOT 1.3* 1.7* 1.0   ALKPHOS 123 96 114           US GALLBLADDER RUQ   Final Result   Unremarkable right upper quadrant ultrasound.       RECOMMENDATIONS: Cholelithiasis. Diffuse enlargement of the liver. Diffuse fatty infiltration of the liver. Under penetration of the liver with ultrasound due to the hepatic fatty   infiltration. XR CHEST PORTABLE   Final Result   Bibasilar atelectasis, otherwise no acute cardiopulmonary process. CT ABDOMEN PELVIS W IV CONTRAST Additional Contrast? None   Final Result   1. Acute pancreatitis causing secondary entero colitis. 2. Trace amount of pelvic ascites and mild mesenteric edema. Assessment & Plan:     Principal Problem:    DKA, type 2, not at goal [facility])  Active Problems:    Diabetic ketoacidosis without coma associated with diabetes mellitus due to underlying condition ([facility] [address]    Alcohol-induced acute pancreatitis without infection or necrosis    Alcohol withdrawal syndrome without complication ([facility] [address]    Primary hypertension  Resolved Problems:    * No resolved hospital problems. *     Acute alcohol induced pancreatitis  -He is NPO. Start clear liquid diet. IV fluids  -Stop Dilaudid. Can use p.o. oxycodone. GI consulted    Mild DKA resolved  Resolved quickly  Patient is currently n.p.o. for pancreatitis, hence diet not started. Continue D5 and sliding scale insulin and Lantus    Alcohol abuse and impending alcohol withdrawal  Placed on scheduled Librium  CIWA scale with Ativan  -No signs of alcohol withdrawal yet. Uncontrolled hypertension-BP better. Continue home lisinopril  Add Norvasc  On as needed labetalol    Full code  Lovenox for DVT prophylaxis  Clear liquid diet. Transfer to [facility].       Reynaldo Corral MD 9/20/2022 2:11 PM

## 2022-09-21 VITALS
WEIGHT: 217.1 LBS | HEART RATE: 103 BPM | RESPIRATION RATE: 16 BRPM | BODY MASS INDEX: 32.9 KG/M2 | HEIGHT: 68 IN | TEMPERATURE: 97.9 F | DIASTOLIC BLOOD PRESSURE: 88 MMHG | SYSTOLIC BLOOD PRESSURE: 149 MMHG | OXYGEN SATURATION: 95 %

## 2022-09-21 LAB
ALBUMIN SERPL-MCNC: 3.2 G/DL (ref 3.4–5)
ALP BLD-CCNC: 110 U/L (ref 40–129)
ALT SERPL-CCNC: 50 U/L (ref 10–40)
AST SERPL-CCNC: 44 U/L (ref 15–37)
BILIRUB SERPL-MCNC: 0.8 MG/DL (ref 0–1)
BILIRUBIN DIRECT: 0.4 MG/DL (ref 0–0.3)
BILIRUBIN, INDIRECT: 0.4 MG/DL (ref 0–1)
GLUCOSE BLD-MCNC: 131 MG/DL (ref 70–99)
GLUCOSE BLD-MCNC: 144 MG/DL (ref 70–99)
GLUCOSE BLD-MCNC: 153 MG/DL (ref 70–99)
GLUCOSE BLD-MCNC: 157 MG/DL (ref 70–99)
GLUCOSE BLD-MCNC: 166 MG/DL (ref 70–99)
HCT VFR BLD CALC: 39.6 % (ref 40.5–52.5)
HEMOGLOBIN: 13.3 G/DL (ref 13.5–17.5)
MCH RBC QN AUTO: 33.2 PG (ref 26–34)
MCHC RBC AUTO-ENTMCNC: 33.6 G/DL (ref 31–36)
MCV RBC AUTO: 98.7 FL (ref 80–100)
PDW BLD-RTO: 14.4 % (ref 12.4–15.4)
PERFORMED ON: ABNORMAL
PLATELET # BLD: 177 K/UL (ref 135–450)
PMV BLD AUTO: 7.1 FL (ref 5–10.5)
RBC # BLD: 4.02 M/UL (ref 4.2–5.9)
TOTAL PROTEIN: 5.7 G/DL (ref 6.4–8.2)
WBC # BLD: 8.6 K/UL (ref 4–11)

## 2022-09-21 PROCEDURE — 6370000000 HC RX 637 (ALT 250 FOR IP): Performed by: INTERNAL MEDICINE

## 2022-09-21 PROCEDURE — 6360000002 HC RX W HCPCS: Performed by: INTERNAL MEDICINE

## 2022-09-21 PROCEDURE — 36415 COLL VENOUS BLD VENIPUNCTURE: CPT

## 2022-09-21 PROCEDURE — 85027 COMPLETE CBC AUTOMATED: CPT

## 2022-09-21 PROCEDURE — 99238 HOSP IP/OBS DSCHRG MGMT 30/<: CPT | Performed by: INTERNAL MEDICINE

## 2022-09-21 PROCEDURE — 80076 HEPATIC FUNCTION PANEL: CPT

## 2022-09-21 RX ORDER — POTASSIUM CHLORIDE 20 MEQ/1
40 TABLET, EXTENDED RELEASE ORAL ONCE
Status: COMPLETED | OUTPATIENT
Start: 2022-09-21 | End: 2022-09-21

## 2022-09-21 RX ORDER — PANTOPRAZOLE SODIUM 40 MG/1
40 TABLET, DELAYED RELEASE ORAL
Status: DISCONTINUED | OUTPATIENT
Start: 2022-09-21 | End: 2022-09-21 | Stop reason: HOSPADM

## 2022-09-21 RX ORDER — OXYCODONE HYDROCHLORIDE 5 MG/1
5 TABLET ORAL EVERY 6 HOURS PRN
Qty: 9 TABLET | Refills: 0 | Status: SHIPPED | OUTPATIENT
Start: 2022-09-21 | End: 2022-09-24

## 2022-09-21 RX ORDER — AMLODIPINE BESYLATE 5 MG/1
5 TABLET ORAL DAILY
Qty: 30 TABLET | Refills: 3 | Status: SHIPPED | OUTPATIENT
Start: 2022-09-21

## 2022-09-21 RX ORDER — ONDANSETRON 4 MG/1
4 TABLET, ORALLY DISINTEGRATING ORAL EVERY 8 HOURS PRN
Qty: 15 TABLET | Refills: 0 | Status: SHIPPED | OUTPATIENT
Start: 2022-09-21

## 2022-09-21 RX ADMIN — POTASSIUM CHLORIDE 40 MEQ: 1500 TABLET, EXTENDED RELEASE ORAL at 08:34

## 2022-09-21 RX ADMIN — CHLORDIAZEPOXIDE HYDROCHLORIDE 10 MG: 10 CAPSULE ORAL at 08:28

## 2022-09-21 RX ADMIN — OXYCODONE 5 MG: 5 TABLET ORAL at 02:29

## 2022-09-21 RX ADMIN — OXYCODONE 5 MG: 5 TABLET ORAL at 11:34

## 2022-09-21 RX ADMIN — OXYCODONE 5 MG: 5 TABLET ORAL at 07:30

## 2022-09-21 RX ADMIN — PANTOPRAZOLE SODIUM 40 MG: 40 TABLET, DELAYED RELEASE ORAL at 08:34

## 2022-09-21 RX ADMIN — ENOXAPARIN SODIUM 40 MG: 100 INJECTION SUBCUTANEOUS at 08:29

## 2022-09-21 RX ADMIN — CHLORDIAZEPOXIDE HYDROCHLORIDE 10 MG: 10 CAPSULE ORAL at 01:57

## 2022-09-21 RX ADMIN — LISINOPRIL 20 MG: 20 TABLET ORAL at 08:28

## 2022-09-21 RX ADMIN — CLONIDINE HYDROCHLORIDE 0.1 MG: 0.1 TABLET ORAL at 07:29

## 2022-09-21 ASSESSMENT — PAIN DESCRIPTION - LOCATION
LOCATION: ABDOMEN

## 2022-09-21 ASSESSMENT — PAIN DESCRIPTION - ONSET
ONSET: ON-GOING
ONSET: ON-GOING

## 2022-09-21 ASSESSMENT — PAIN DESCRIPTION - PAIN TYPE: TYPE: ACUTE PAIN

## 2022-09-21 ASSESSMENT — PAIN DESCRIPTION - FREQUENCY
FREQUENCY: CONTINUOUS
FREQUENCY: CONTINUOUS

## 2022-09-21 ASSESSMENT — PAIN DESCRIPTION - ORIENTATION
ORIENTATION: MID

## 2022-09-21 ASSESSMENT — PAIN - FUNCTIONAL ASSESSMENT
PAIN_FUNCTIONAL_ASSESSMENT: ACTIVITIES ARE NOT PREVENTED

## 2022-09-21 ASSESSMENT — PAIN DESCRIPTION - DESCRIPTORS
DESCRIPTORS: ACHING;DISCOMFORT
DESCRIPTORS: ACHING
DESCRIPTORS: ACHING;DISCOMFORT

## 2022-09-21 ASSESSMENT — PAIN SCALES - GENERAL
PAINLEVEL_OUTOF10: 8

## 2022-09-21 NOTE — PROGRESS NOTES
Admit: 2022    Name:  Kevin Bacon  Room:  [address] [place], [place] [address]  MRN:    [id number]  IM Daily Progress Note for 2022     Admitted for acute alcoholic pancreatitis    Interval History:     -DKA has resolved. He is awake and alert. He says he is gone brief periods without drinking. Denies any hallucinations. He still NPO. Has some abdominal pain. -MR Marleen Guzmán tolerated diet well and no pain issues  No BM yet  No detox symptoms       Scheduled Meds:   mupirocin   Nasal BID    nicotine  1 patch TransDERmal Daily    pantoprazole  40 mg IntraVENous Daily    enoxaparin  40 mg SubCUTAneous Daily    lisinopril  20 mg Oral Daily    insulin glargine  9 Units SubCUTAneous Nightly    insulin lispro  0-8 Units SubCUTAneous 6 times per day    chlordiazePOXIDE  10 mg Oral Q6H       Continuous Infusions:   dextrose 5 % and 0.45 % NaCl 100 mL/hr at 22 1804       PRN Meds:  oxyCODONE, cloNIDine, labetalol, albuterol sulfate HFA, dextrose bolus **OR** dextrose bolus, magnesium sulfate, prochlorperazine, LORazepam **OR** LORazepam **OR** LORazepam **OR** LORazepam **OR** LORazepam **OR** LORazepam **OR** LORazepam **OR** LORazepam       Objective:     Temp  Av.2 °F (36.8 °C)  Min: 96.8 °F (36 °C)  Max: 99.3 °F (37.4 °C)  Pulse  Av.7  Min: 82  Max: 106  BP  Min: 138/94  Max: 176/97  SpO2  Av.2 %  Min: 94 %  Max: 99 %  Patient Vitals for the past 4 hrs:   BP Pulse   22 0421 (!) 151/90 (!) 103         No intake or output data in the 24 hours ending 22 0720      Physical Exam:        General:  middle aged male, obese up sitting up in bed   Awake, alert and oriented.  Appears to be not in any distress  Mucous Membranes:  Pink , anicteric  Neck: No JVD, no carotid bruit, no thyromegaly  Chest:  Clear to auscultation bilaterally, no added sounds  Cardiovascular:  RRR S1S2 heard, no murmurs or gallops  Abdomen: firm undistended, non tender, no organomegaly, BS present  Extremities: No edema or cyanosis. Distal pulses well felt  Neurological : grossly normal    Lab Data:  CBC:   Recent Labs     09/20/22  0442 09/21/22  0506   WBC 8.9 8.6   RBC 4.45 4.02*   HGB 14.9 13.3*   HCT 44.1 39.6*   MCV 99.1 98.7   RDW 14.1 14.4    177       BMP:   Recent Labs     09/20/22  1118 09/20/22  1716 09/20/22  2316    134* 131*   K 3.4* 3.2* 3.4*    96* 95*   CO2 23 23 22   PHOS 2.3* 2.6 2.7   BUN 10 9 8   CREATININE <0.5* <0.5* <0.5*       LIVER PROFILE:   Recent Labs     09/20/22  0442 09/21/22  0506   AST 61* 44*   ALT 60* 50*   BILIDIR 0.6* 0.4*   BILITOT 1.0 0.8   ALKPHOS 114 110           US GALLBLADDER RUQ   Final Result   Unremarkable right upper quadrant ultrasound. RECOMMENDATIONS:   Cholelithiasis. Diffuse enlargement of the liver. Diffuse fatty infiltration of the liver. Under penetration of the liver with ultrasound due to the hepatic fatty   infiltration. XR CHEST PORTABLE   Final Result   Bibasilar atelectasis, otherwise no acute cardiopulmonary process. CT ABDOMEN PELVIS W IV CONTRAST Additional Contrast? None   Final Result   1. Acute pancreatitis causing secondary entero colitis. 2. Trace amount of pelvic ascites and mild mesenteric edema. Assessment & Plan:          Acute alcohol induced pancreatitis  -improved with conservative mx with NPO , IVF and pain meds  - started diet and tolerating  - recommend complete alcohol cessation   - off IV pain meds . Can use p.o. oxycodone. GI consulted    Mild DKA   Resolved quickly  Resumed diet and tolerating now, adv  Low fat and low carb diet   Resume home insulin , sugars stable     Alcohol abuse   Placed on scheduled Librium  CIWA scale with Ativan  -No signs of alcohol withdrawal yet. Uncontrolled hypertension-BP better. Continue home lisinopril  Added norvasc    Full code  Lovenox for DVT prophylaxis  Clear liquid diet.     Dc planning to home      Kishorus Nargis MD 9/21/2022 7:20 AM

## 2022-09-21 NOTE — DISCHARGE INSTRUCTIONS
Avoid fried foods and alcohol  F/w PCP in 1 week       Your information:  Name: Markel Rhodes  : 1975    Your instructions: Follow instructions above. What to do after you leave the hospital:    Recommended diet: low fat, low cholesterol diet    Recommended activity: activity as tolerated        The following personal items were collected during your admission and were returned to you:    Belongings  Dental Appliances: Uppers, At bedside  Vision - Corrective Lenses: Eyeglasses, At bedside  Hearing Aid: None  Clothing: Footwear, Shirt, Shorts, Socks, Undergarments, Other (Comment) (hat)  Jewelry: Ring (left hand)  Body Piercings Removed: N/A  Electronic Devices: Cell Phone, At bedside  Weapons (Notify Protective Services/Security): None  Other Valuables: Money, Other (Comment), At bedside (1 dollar and stack of credit cards rubberband together pt refused to send to security in pts short pocket on pt)  Home Medications: None  Valuables Given To: Patient (pt refused to lock up credit cards and phone)  Patient approves for provider to speak to responsible person post operatively: No    Information obtained by:  By signing below, I understand that if any problems occur once I leave the hospital I am to contact MD.  I understand and acknowledge receipt of the instructions indicated above.

## 2022-09-21 NOTE — PLAN OF CARE
Problem: Discharge Planning  Goal: Discharge to home or other facility with appropriate resources  Outcome: Progressing  Flowsheets (Taken 9/20/2022 1804)  Discharge to home or other facility with appropriate resources: Identify barriers to discharge with patient and caregiver     Problem: Chronic Conditions and Co-morbidities  Goal: Patient's chronic conditions and co-morbidity symptoms are monitored and maintained or improved  Outcome: Progressing  Flowsheets (Taken 9/20/2022 1804)  Care Plan - Patient's Chronic Conditions and Co-Morbidity Symptoms are Monitored and Maintained or Improved: Monitor and assess patient's chronic conditions and comorbid symptoms for stability, deterioration, or improvement     Problem: Pain  Goal: Verbalizes/displays adequate comfort level or baseline comfort level  Outcome: Progressing  Flowsheets (Taken 9/20/2022 1804)  Verbalizes/displays adequate comfort level or baseline comfort level:   Encourage patient to monitor pain and request assistance   Assess pain using appropriate pain scale   Administer analgesics based on type and severity of pain and evaluate response   Implement non-pharmacological measures as appropriate and evaluate response     Problem: ABCDS Injury Assessment  Goal: Absence of physical injury  Outcome: Progressing     Problem: Skin/Tissue Integrity - Adult  Goal: Skin integrity remains intact  Outcome: Progressing  Flowsheets (Taken 9/20/2022 1804)  Skin Integrity Remains Intact: Monitor for areas of redness and/or skin breakdown     Problem: Infection - Adult  Goal: Absence of infection at discharge  Outcome: Progressing  Flowsheets (Taken 9/20/2022 1804)  Absence of infection at discharge: Assess and monitor for signs and symptoms of infection     Problem: Metabolic/Fluid and Electrolytes - Adult  Goal: Electrolytes maintained within normal limits  Outcome: Progressing  Flowsheets (Taken 9/20/2022 1804)  Electrolytes maintained within normal limits: Monitor

## 2022-09-21 NOTE — FLOWSHEET NOTE
BP (!) 158/100   Pulse 93   Temp 98.2 °F (36.8 °C) (Oral)   Resp 18   Ht 5' 8\" (1.727 m)   Wt 218 lb 14.4 oz (99.3 kg)   SpO2 97%   BMI 33.28 kg/m²     Shift assessment complete; see flowsheets. PM medications administered; see MAR. CIWA score 0, so signs of active withdrawal at this time. Pt AOx4 resting in bed. Respirations even and unlabored. Pt denies any further needs or pain at this time. Call light in reach, bed locked in lowest position.

## 2022-09-21 NOTE — PROGRESS NOTES
Pt asked for pain medication. Went to give oxycodone to pt and pt dropped on floor. Found oxycodone and wasted medication in omnicel with Penelope MCRAE. New oxycodone removed and given.       Electronically signed by Jaden Jha RN on 6/74/9122 at 12:27 PM

## 2022-09-21 NOTE — PROGRESS NOTES
Resting in bed awake. C/o abdominal pain. Am assessment complete. Alert and oriented. /100 with heart rate of 108. Call light in reach. Patient is able to demonstrate the ability to move from a reclining position to an upright position within the recliner. Bedside Mobility Assessment Tool (BMAT):     Assessment Level 1- Sit and Shake    1. From a semi-reclined position, ask patient to sit up and rotate to a seated position at the side of the bed. Can use the bedrail. 2. Ask patient to reach out and grab your hand and shake making sure patient reaches across his/her midline. Pass- Patient is able to come to a seated position, maintain core strength. Maintains seated balance while reaching across midline. Move on to Assessment Level 2. Assessment Level 2- Stretch and Point   1. With patient in seated position at the side of the bed, have patient place both feet on the floor (or stool) with knees no higher than hips. 2. Ask patient to stretch one leg and straighten the knee, then bend the ankle/flex and point the toes. If appropriate, repeat with the other leg. Pass- Patient is able to demonstrate appropriate quad strength on intended weight bearing limb(s). Move onto Assessment Level 3. Assessment Level 3- Stand   1. Ask patient to elevate off the bed or chair (seated to standing) using an assistive device (cane, bedrail). 2. Patient should be able to raise buttocks off be and hold for a count of five. May repeat once. Pass- Patient maintains standing stability for at least 5 seconds, proceed to assessment level 4. Assessment Level 4- Walk   1. Ask patient to march in place at bedside. 2. Then ask patient to advance step and return each foot. Some medical conditions may render a patient from stepping backwards, use your best clinical judgement.    Pass- Patient demonstrates balance while shifting weight and ability to step, takes independent steps, does not use assistive device patient is MOBILITY LEVEL 4.       Mobility Level- 4

## 2022-09-21 NOTE — PROGRESS NOTES
Pt c/o abdominal pain rate as 8/10. Requesting pain medication. Oxycodone 1 tablet po given. See mar.

## 2022-09-21 NOTE — FLOWSHEET NOTE
09/20/22 1927   Handoff   Communication Given Shift Handoff   Handoff Given To Mika   Handoff Received From [facility] Communication Face to Face; At bedside   Time Handoff Given 1927   End of Shift Check Performed Yes

## 2022-09-21 NOTE — PROGRESS NOTES
C/o abdominal pain rate as 8/10. BP of 163/100 with heart rate of 108. Oxycodone and clonidine given. See mar. Call light in reach.

## 2022-09-21 NOTE — DISCHARGE SUMMARY
Name:  Ileana Knapp  Room:  0203/0203-01  MRN:    [id number]    Discharge Summary      This discharge summary is in conjunction with a complete physical exam done on the day of discharge. Attending Physician: Dr. Kanu Cates  Discharging Physician: Dr. Galdamez Siemens: 9/17/2022  Discharge:  9/21/2022    HPI:  The patient is a 52 y.o. male with PMH below, presents with abd pain, N/V, polydipsia, ETOH. Pt reports that he has had N/V x2 d. TMTC episodes, NBNB. Worsening diffuse abd pain since yd, crampy, moderate to severe, constant. Has had associated polydipsia. He says today he felt like he could not drink enough. He decided to try chocolate milk and regular [facility]. This did not solve the problem. He also tried to treat his N/V and abd pain w/ drinking extra whiskey which seemed to make his Sx worse. He is a daily drinker of 12+ beers/d. He also drinks variable amounts of whiskey daily. He says he hides the whiskey in the garage so his wife doesn't find out. He denies Hx of DT's or Sz w/ cessation but says he does get \"shaky\". Lastly, he is requiring 2L O2 after receiving 2L bolus and multiple rounds of IV pain Rx in the preceding hours. He is not normally on O2. He has been a long term smoker but denies Hx of COPD. Diagnoses this Admission and Hospital Course      Acute alcohol induced pancreatitis  -improved with conservative mx with NPO , IVF and pain meds  - started diet and tolerating  - recommend complete alcohol cessation   - off IV pain meds . Can use p.o. oxycodone. GI consulted and followed during adm  - need outpt f/w      Mild DKA   Resolved quickly  Resumed diet and tolerating now, adv  Low fat and low carb diet   Resumed home insulin , sugars stable      Alcohol abuse   Placed on scheduled Librium  CIWA scale with Ativan  -No signs of alcohol withdrawal     Uncontrolled hypertension-BP better.   Continued home lisinopril  Added norvasc       Dc planning to home    Procedures (Please Review Full Report for Details)  N/A    Consults    GI  Pulmonology       Physical Exam at Discharge:    BP (!) 149/88   Pulse (!) 103   Temp 97.9 °F (36.6 °C) (Oral)   Resp 16   Ht 5' 8\" (1.727 m)   Wt 217 lb 1.6 oz (98.5 kg)   SpO2 95%   BMI 33.01 kg/m²     See today's progress note    CBC:   Recent Labs     09/20/22  0442 09/21/22  0506   WBC 8.9 8.6   HGB 14.9 13.3*   HCT 44.1 39.6*   MCV 99.1 98.7    177     BMP:   Recent Labs     09/20/22  1118 09/20/22  1716 09/20/22  2316    134* 131*   K 3.4* 3.2* 3.4*    96* 95*   CO2 23 23 22   PHOS 2.3* 2.6 2.7   BUN 10 9 8   CREATININE <0.5* <0.5* <0.5*     LIVER PROFILE:   Recent Labs     09/20/22  0442 09/21/22  0506   AST 61* 44*   ALT 60* 50*   BILIDIR 0.6* 0.4*   BILITOT 1.0 0.8   ALKPHOS 114 110     CARDIAC ENZYMES  Recent Labs     09/18/22  1617   TROPONINI <0.01       U/A:    Lab Results   Component Value Date/Time    COLORU Yellow 09/17/2022 05:05 PM    WBCUA None seen 09/17/2022 05:05 PM    RBCUA 0-2 09/17/2022 05:05 PM    MUCUS Rare 04/07/2014 10:30 AM    BACTERIA Rare 09/17/2022 05:05 PM    CLARITYU Clear 09/17/2022 05:05 PM    SPECGRAV <=1.005 09/17/2022 05:05 PM    LEUKOCYTESUR Negative 09/17/2022 05:05 PM    BLOODU TRACE-INTACT 09/17/2022 05:05 PM    GLUCOSEU >=1000 09/17/2022 05:05 PM       CULTURES  COVID: not detected    RADIOLOGY  US GALLBLADDER RUQ   Final Result   Unremarkable right upper quadrant ultrasound. RECOMMENDATIONS:   Cholelithiasis. Diffuse enlargement of the liver. Diffuse fatty infiltration of the liver. Under penetration of the liver with ultrasound due to the hepatic fatty   infiltration. XR CHEST PORTABLE   Final Result   Bibasilar atelectasis, otherwise no acute cardiopulmonary process. CT ABDOMEN PELVIS W IV CONTRAST Additional Contrast? None   Final Result   1. Acute pancreatitis causing secondary entero colitis.    2. Trace amount of pelvic ascites and mild mesenteric edema. Discharge Medications     Medication List        START taking these medications      amLODIPine 5 MG tablet  Commonly known as: NORVASC  Take 1 tablet by mouth daily  Notes to patient: Amlodipine (Norvasc ®)  Use: lower blood pressure. Side effects: dizziness, headache, and    constipation. ondansetron 4 MG disintegrating tablet  Commonly known as: Zofran ODT  Take 1 tablet by mouth every 8 hours as needed for Nausea or Vomiting  Notes to patient: Ondansetron (Zofran®)  Use: nausea and vomiting. Side effects: headache, weakness or dizziness. oxyCODONE 5 MG immediate release tablet  Commonly known as: ROXICODONE  Take 1 tablet by mouth every 6 hours as needed for Pain for up to 3 days. Notes to patient: Oxycontin(Oxycodone)  Use:  pain    Side effects: sleepiness, constipation            CONTINUE taking these medications      albuterol sulfate  (90 Base) MCG/ACT inhaler  Commonly known as: PROVENTIL;VENTOLIN;PROAIR     blood glucose test strips strip  Commonly known as: ASCENSIA AUTODISC VI;ONE TOUCH ULTRA TEST VI  1 each by In Vitro route daily As needed.      diphenhydrAMINE-APAP (sleep)  MG tablet  Commonly known as: TYLENOL PM EXTRA STRENGTH     Easy Mini Eject Lancing Device Misc     esomeprazole 20 MG delayed release capsule  Commonly known as: NEXIUM     insulin glargine 100 UNIT/ML injection pen  Commonly known as: Basaglar KwikPen  Inject 18 Units into the skin daily     lisinopril 20 MG tablet  Commonly known as: PRINIVIL;ZESTRIL     OneTouch Verio Flex System w/Device Kit     Pen Needles 3/16\" 31G X 5 MM Misc  1 each by Does not apply route daily     SOFT TOUCH LANCETS Misc  Use once daily            STOP taking these medications      ibuprofen 800 MG tablet  Commonly known as: ADVIL;MOTRIN               Where to Get Your Medications        These medications were sent to [address] [address] [address] [address] [address], [address] [address]      Phone: [phone]   amLODIPine 5 MG tablet  ondansetron 4 MG disintegrating tablet  oxyCODONE 5 MG immediate release tablet           Discharged in stable condition to home. Stop alcohol     Follow Up: Follow up with PCP.     Elvia Bean MD, 10/19/2022 10:45 AM

## 2022-09-21 NOTE — PROGRESS NOTES
D/c instructions given to pt with prescriptions sent to Outpatient pharmacy. Explanation of new medications and instructions. Verbalized understanding. D/c per foot to private car then will drive around to pharmacy to  medication.

## 2022-11-30 ENCOUNTER — HOSPITAL ENCOUNTER (OUTPATIENT)
Dept: NON INVASIVE DIAGNOSTICS | Age: 47
Discharge: HOME | End: 2022-11-30
Payer: MEDICARE

## 2022-11-30 DIAGNOSIS — R00.0 TACHYCARDIA, UNSPECIFIED: ICD-10-CM

## 2022-11-30 PROCEDURE — 93306 TTE W/DOPPLER COMPLETE: CPT

## 2022-12-16 NOTE — PROGRESS NOTES
[address] Office consultation Note  2022     Referred by Ina Shipley NP    Subjective:  Mr. Christiana Arriaga is here to establish cardiac care for abnormal EKG and chest pain. PMH of HTN, HLD, DM II, h/o alcohol abuse, h/o opioid dependence, h/o methadone, bipolar  C/o abnormal EKG    HPI:   Today, he reports he is here because he had an ekg in 2022 for wrist surgery and pcp told him that he thought he saw water on his lungs. He gets pains under his left armpit He does not know if it is from previous injury. He is a  and works on cars and motorcycles. 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. He smokes 1 to 1 1/2 packs a day. He drinks around a 12 pack of beer weekly. He reports having numbness in his feet from the diabetes. He denies any energy drinks. He drinks occasional coffee. Denies excessive caffeine. He was out of his pills that controls his heart rate and he just got them refilled this morning. Patient denies current edema, sob, palpitations, dizziness or syncope. Patient is taking all cardiac medications as prescribed and tolerates them well. Patient is  vaccinated against Covid. 12 point ROS negative in all areas as listed below except as in [address], EENT,  pulmonary, GI, , Musculoskeletal, skin, neurological, hematological, endocrine, Psychiatric     Reviewed past medical history, social, and family history.    Smokin 1/12 pack a day  Alcohol: 12 pack a week  Recreational Drugs: none  Family History:  Mom open heart surgery in late [de-identified]  Dad pacemaker,  of  maker    Past Medical History:   Diagnosis Date    Anxiety     Behavior problem     due to pain    Bipolar 1 disorder (HCC)     Diabetes mellitus ([facility] [address]     Drug tolerance     opiod    Facet arthropathy     Fracture of cervical vertebra, C7 (HCC)     GERD (gastroesophageal reflux disease)     Hyperlipemia     Hypertension     Impingement syndrome of left shoulder     Mood disorder (HCC)     Myofascial pain     Pains, face     Spondylolysis     Substance abuse/dependence     methadone     Past Surgical History:   Procedure Laterality Date    HERNIA REPAIR      testicle    WRIST FRACTURE SURGERY         Objective:   /80   Pulse (!) 106   Temp 98.7 °F (37.1 °C)   Ht 5' 8\" (1.727 m)   Wt 200 lb (90.7 kg)   SpO2 96%   BMI 30.41 kg/m²     Wt Readings from Last 3 Encounters:   12/19/22 200 lb (90.7 kg)   09/21/22 217 lb 1.6 oz (98.5 kg)   08/18/22 208 lb (94.3 kg)       Physical Exam:  General: No Respiratory distress, appears well developed and well nourished. Eyes:  Sclera nonicteric  Nose/Sinuses:  negative findings: nose shows no deformity, asymmetry, or inflammation, nasal mucosa normal, septum midline with no perforation or bleeding  Back:  no pain to palpation  Joint:  no active joint inflammation  Musculoskeletal:  negative  Skin:  Warm and dry  Neck:  Negative for JVD and Carotid Bruits. Chest:  Clear to auscultation, respiration easy  Cardiovascular:  RRR 96, S1S2 normal, no murmur, no rub or thrill.   Abdomen:  Soft normal liver and spleen, sensitive to touch  Extremities:   No edema, clubbing, cyanosis,  Pulses: Femoral and pedal pulses are normal.  Neuro: intact    Medications:   Outpatient Encounter Medications as of 12/19/2022   Medication Sig Dispense Refill    JARDIANCE 10 MG tablet       carvedilol (COREG) 3.125 MG tablet       amLODIPine (NORVASC) 5 MG tablet Take 1 tablet by mouth daily 30 tablet 3    Blood Glucose Monitoring Suppl (ONETOUCH VERIO FLEX SYSTEM) w/Device KIT       Lancet Devices (EASY MINI EJECT LANCING DEVICE) MISC       esomeprazole (NEXIUM) 20 MG delayed release capsule       lisinopril (PRINIVIL;ZESTRIL) 20 MG tablet       albuterol sulfate  (90 Base) MCG/ACT inhaler       insulin glargine (BASAGLAR KWIKPEN) 100 UNIT/ML injection pen Inject 18 Units into the skin daily 5 pen 3    Insulin Pen Needle (PEN NEEDLES 3/16\") 31G X 5 MM MISC 1 each by Does not apply route daily 100 each 3    blood glucose test strips (ASCENSIA AUTODISC VI;ONE TOUCH ULTRA TEST VI) strip 1 each by In Vitro route daily As needed. 100 each 3    SOFT TOUCH LANCETS MISC Use once daily 100 each 3    diphenhydrAMINE-APAP, sleep, (TYLENOL PM EXTRA STRENGTH)  MG tablet Take 1 tablet by mouth nightly as needed for Sleep      [DISCONTINUED] ondansetron (ZOFRAN ODT) 4 MG disintegrating tablet Take 1 tablet by mouth every 8 hours as needed for Nausea or Vomiting 15 tablet 0    [DISCONTINUED] ibuprofen (ADVIL;MOTRIN) 800 MG tablet Take 1 tablet by mouth every 6 hours as needed for Pain 20 tablet 0     No facility-administered encounter medications on file as of 12/19/2022. Lab Data:  CBC: No results for input(s): WBC, HGB, HCT, MCV, PLT in the last 72 hours. BMP: No results for input(s): NA, K, CL, CO2, PHOS, BUN, CREATININE, CA in the last 72 hours. LIVER PROFILE: No results for input(s): AST, ALT, LIPASE, BILIDIR, BILITOT, ALKPHOS in the last 72 hours. Invalid input(s): AMYLASE,  ALB  LIPID: No results found for: CHOL  Lab Results   Component Value Date    TRIG 237 (H) 09/18/2022    TRIG 315 (H) 09/17/2022     No results found for: HDL  No results found for: LDLCHOLESTEROL, LDLCALC  No results found for: LABVLDL, VLDL  No results found for: CHOLHDLRATIO  PT/INR: No results for input(s): PROTIME, INR in the last 72 hours. A1C:   Lab Results   Component Value Date    LABA1C 11.9 02/07/2019     BNP:  No results for input(s): BNP in the last 72 hours. IMAGING:   EKG 12/19/22  Sinus  Tachycardia 109 bpm  Nonspecific T-abnormality. QTc normal     Echo 11/30/22   Left ventricular systolic function is normal with ejection fraction   estimated at 55%. No regional wall motion abnormalities. Grade I diastolic dysfunction with normal left ventricular filling pressure. No significant valvular heart disease.     EKG 9/17/22  Sinus tachycardia Possible Left atrial enlargement Prolonged QT  Chest xray 9.17.22  FINDINGS:   Cardiac size and mediastinal structures appear similar. Low lung volumes   with bibasilar atelectasis. No acute osseous abnormality. No pneumothorax. Mild degenerative change at the [facility] joints. Impression   Bibasilar atelectasis, otherwise no acute cardiopulmonary process. Stress Echo 1/27/17  1. Negative ECG for ischemia with graded exercise test.    2. Duke Treadmill Score is 7 which indicates low risk. 3. Negative stress echo with no indication of inducible ischemia. Assessment:  Zayra Willoughby was seen today for new patient, hypertension, hyperlipidemia and other. Diagnoses and all orders for this visit:    Unstable angina pectoris ([address]    Primary hypertension    Mixed hyperlipidemia    Abnormal EKG  -     ECHO Exercise Stress Test; Future    Sinus tachycardia  -     EKG 12 lead  -     ECHO Exercise Stress Test; Future      EKG shows no ischemia. CP is atypical but he has many risk factors so he needs one. BP is controlled continue amlodipine  carvedilol  lisinopril   Hyperlipidemia on no statin waiting for blood work from Edson Reich NP  Sinus tachycardia  advised to avoid excessive alcohol stimulants caffeine continue carvedilol may titrate up after the stress test   H/o prolonged QTc but today it is normal avoid drugs like Ondansetron  Zofran  Plan:  Current epic labs reviewed with patient. Current medications reviewed. Refills given as warranted. 3.   EKG today sinus tach intervals are normal  4. Call [phone] to schedule a Stress Echo    -make sure you are fasting.   -wear comfortable clothes   -no caffeine for 24 hours before the test   -try to go as long as you can on the test so we have a better picture of what is happening with your heart. 5.  I will personally review your tests and then I will have my staff call you with the results.      Follow up with me in 3 months    This note is scribed in the presence of Dr. Keith Ruiz MD by José Sykes, RN.    I, Dr. Jonathan Harper, personally performed the services described in this documentation, as scribed by the above signed scribe in my presence. It is both accurate and complete to my knowledge. I agree with the details independently gathered by the clinical support staff, while the remaining scribed note accurately describes my personal service to the patient. QUALITY MEASURES  1. Tobacco Cessation Counseling: Yes  2. Retake of BP if >140/90:   NA  3. Documentation to PCP/referring for new patient:  Sent to PCP at close of office visit  4. CAD patient on anti-platelet: NA waiting for stress test results  5. CAD patient on STATIN therapy:  NA after stress test  6.  Patient with CHF and aFib on anticoagulation:  NA       Riley Dang MD, MD 12/19/2022 4:07 PM

## 2022-12-19 ENCOUNTER — TELEPHONE (OUTPATIENT)
Dept: CARDIOLOGY CLINIC | Age: 47
End: 2022-12-19

## 2022-12-19 ENCOUNTER — OFFICE VISIT (OUTPATIENT)
Dept: CARDIOLOGY CLINIC | Age: 47
End: 2022-12-19
Payer: MEDICARE

## 2022-12-19 VITALS
TEMPERATURE: 98.7 F | BODY MASS INDEX: 30.31 KG/M2 | HEIGHT: 68 IN | OXYGEN SATURATION: 96 % | DIASTOLIC BLOOD PRESSURE: 80 MMHG | SYSTOLIC BLOOD PRESSURE: 128 MMHG | WEIGHT: 200 LBS | HEART RATE: 106 BPM

## 2022-12-19 DIAGNOSIS — R94.31 ABNORMAL EKG: ICD-10-CM

## 2022-12-19 DIAGNOSIS — R00.0 SINUS TACHYCARDIA: ICD-10-CM

## 2022-12-19 DIAGNOSIS — I20.0 UNSTABLE ANGINA PECTORIS (HCC): Primary | ICD-10-CM

## 2022-12-19 DIAGNOSIS — E78.2 MIXED HYPERLIPIDEMIA: ICD-10-CM

## 2022-12-19 DIAGNOSIS — I10 PRIMARY HYPERTENSION: ICD-10-CM

## 2022-12-19 PROCEDURE — G8417 CALC BMI ABV UP PARAM F/U: HCPCS | Performed by: INTERNAL MEDICINE

## 2022-12-19 PROCEDURE — 99204 OFFICE O/P NEW MOD 45 MIN: CPT | Performed by: INTERNAL MEDICINE

## 2022-12-19 PROCEDURE — 3074F SYST BP LT 130 MM HG: CPT | Performed by: INTERNAL MEDICINE

## 2022-12-19 PROCEDURE — G8427 DOCREV CUR MEDS BY ELIG CLIN: HCPCS | Performed by: INTERNAL MEDICINE

## 2022-12-19 PROCEDURE — 3079F DIAST BP 80-89 MM HG: CPT | Performed by: INTERNAL MEDICINE

## 2022-12-19 PROCEDURE — 93000 ELECTROCARDIOGRAM COMPLETE: CPT | Performed by: INTERNAL MEDICINE

## 2022-12-19 PROCEDURE — G8484 FLU IMMUNIZE NO ADMIN: HCPCS | Performed by: INTERNAL MEDICINE

## 2022-12-19 RX ORDER — EMPAGLIFLOZIN 10 MG/1
TABLET, FILM COATED ORAL
COMMUNITY
Start: 2022-11-10

## 2022-12-19 RX ORDER — CARVEDILOL 3.12 MG/1
TABLET ORAL
COMMUNITY
Start: 2022-11-10

## 2022-12-19 NOTE — PATIENT INSTRUCTIONS
Plan:  Current epic labs reviewed with patient. Current medications reviewed. Refills given as warranted. 3.   EKG today  4. Call [phone] to schedule a Stress Echo    -make sure you are fasting.   -wear comfortable clothes   -no caffeine for 24 hours before the test   -try to go as long as you can on the test so we have a better picture of what is happening with your heart. 5.  I will personally review your tests and then I will have my staff call you with the results.      Follow up with me in 3 months

## 2023-01-17 ENCOUNTER — HOSPITAL ENCOUNTER (OUTPATIENT)
Dept: NON INVASIVE DIAGNOSTICS | Age: 48
Discharge: HOME | End: 2023-01-17
Payer: MEDICARE

## 2023-01-17 DIAGNOSIS — R94.31 ABNORMAL EKG: ICD-10-CM

## 2023-01-17 DIAGNOSIS — R00.0 SINUS TACHYCARDIA: ICD-10-CM

## 2023-01-17 LAB
LV EF: 60 %
LVEF MODALITY: NORMAL

## 2023-01-17 PROCEDURE — 6360000004 HC RX CONTRAST MEDICATION: Performed by: INTERNAL MEDICINE

## 2023-01-17 PROCEDURE — 93351 STRESS TTE COMPLETE: CPT

## 2023-01-17 RX ADMIN — PERFLUTREN 1.5 ML: 6.52 INJECTION, SUSPENSION INTRAVENOUS at 09:35

## 2023-02-21 ENCOUNTER — HOSPITAL ENCOUNTER (OUTPATIENT)
Dept: CT IMAGING | Age: 48
Discharge: HOME | End: 2023-02-21
Payer: COMMERCIAL

## 2023-02-21 DIAGNOSIS — M25.631 LIMITATION OF JOINT MOTION OF WRIST, RIGHT: ICD-10-CM

## 2023-02-21 DIAGNOSIS — S62.021A CLOSED OBLIQUE FRACTURE OF WAIST OF SCAPHOID, RIGHT, INITIAL ENCOUNTER: ICD-10-CM

## 2023-02-21 DIAGNOSIS — M25.531 RIGHT WRIST PAIN: ICD-10-CM

## 2023-02-21 DIAGNOSIS — S62.021K OPEN OBLIQUE FRACTURE OF WAIST OF SCAPHOID, RIGHT, WITH NONUNION, SUBSEQUENT ENCOUNTER: ICD-10-CM

## 2023-02-21 DIAGNOSIS — S62.009A: ICD-10-CM

## 2023-02-21 DIAGNOSIS — S62.033A: ICD-10-CM

## 2023-02-21 PROCEDURE — 73200 CT UPPER EXTREMITY W/O DYE: CPT

## 2023-10-11 ENCOUNTER — HOSPITAL ENCOUNTER (EMERGENCY)
Age: 48
Discharge: HOME | End: 2023-10-11
Attending: STUDENT IN AN ORGANIZED HEALTH CARE EDUCATION/TRAINING PROGRAM
Payer: MEDICAID

## 2023-10-11 VITALS
HEIGHT: 68 IN | DIASTOLIC BLOOD PRESSURE: 74 MMHG | TEMPERATURE: 98.4 F | OXYGEN SATURATION: 96 % | SYSTOLIC BLOOD PRESSURE: 112 MMHG | BODY MASS INDEX: 29.36 KG/M2 | RESPIRATION RATE: 16 BRPM | HEART RATE: 112 BPM | WEIGHT: 193.7 LBS

## 2023-10-11 DIAGNOSIS — T78.40XA ALLERGIC REACTION, INITIAL ENCOUNTER: Primary | ICD-10-CM

## 2023-10-11 PROCEDURE — 96374 THER/PROPH/DIAG INJ IV PUSH: CPT

## 2023-10-11 PROCEDURE — 99284 EMERGENCY DEPT VISIT MOD MDM: CPT

## 2023-10-11 PROCEDURE — 2500000003 HC RX 250 WO HCPCS: Performed by: STUDENT IN AN ORGANIZED HEALTH CARE EDUCATION/TRAINING PROGRAM

## 2023-10-11 RX ORDER — FAMOTIDINE 40 MG/1
TABLET, FILM COATED ORAL
COMMUNITY
Start: 2021-07-08

## 2023-10-11 RX ORDER — METOPROLOL SUCCINATE 50 MG/1
TABLET, EXTENDED RELEASE ORAL
COMMUNITY
Start: 2023-10-11

## 2023-10-11 RX ORDER — HYDROCODONE BITARTRATE AND ACETAMINOPHEN 7.5; 3 MG/1; MG/1
TABLET ORAL
COMMUNITY
Start: 2023-07-19

## 2023-10-11 RX ORDER — DIPHENHYDRAMINE HCL 25 MG
25 CAPSULE ORAL EVERY 4 HOURS PRN
Qty: 1 CAPSULE | Refills: 0 | Status: SHIPPED | OUTPATIENT
Start: 2023-10-11 | End: 2023-10-21

## 2023-10-11 RX ORDER — EPINEPHRINE 0.3 MG/.3ML
0.3 INJECTION SUBCUTANEOUS ONCE
Qty: 0.3 ML | Refills: 0 | Status: SHIPPED | OUTPATIENT
Start: 2023-10-11 | End: 2023-10-11

## 2023-10-11 RX ORDER — AMOXICILLIN AND CLAVULANATE POTASSIUM 875; 125 MG/1; MG/1
TABLET, FILM COATED ORAL
COMMUNITY
Start: 2022-02-08 | End: 2023-10-17

## 2023-10-11 RX ORDER — FAMOTIDINE 10 MG/ML
20 INJECTION, SOLUTION INTRAVENOUS ONCE
Status: COMPLETED | OUTPATIENT
Start: 2023-10-11 | End: 2023-10-11

## 2023-10-11 RX ADMIN — FAMOTIDINE 20 MG: 10 INJECTION, SOLUTION INTRAVENOUS at 22:18

## 2023-10-11 ASSESSMENT — PATIENT HEALTH QUESTIONNAIRE - PHQ9
2. FEELING DOWN, DEPRESSED OR HOPELESS: 0
SUM OF ALL RESPONSES TO PHQ QUESTIONS 1-9: 0
1. LITTLE INTEREST OR PLEASURE IN DOING THINGS: 0
SUM OF ALL RESPONSES TO PHQ QUESTIONS 1-9: 0
SUM OF ALL RESPONSES TO PHQ QUESTIONS 1-9: 0
SUM OF ALL RESPONSES TO PHQ9 QUESTIONS 1 & 2: 0
SUM OF ALL RESPONSES TO PHQ QUESTIONS 1-9: 0

## 2023-10-11 ASSESSMENT — LIFESTYLE VARIABLES: HOW OFTEN DO YOU HAVE A DRINK CONTAINING ALCOHOL: MONTHLY OR LESS

## 2023-10-11 ASSESSMENT — PAIN - FUNCTIONAL ASSESSMENT: PAIN_FUNCTIONAL_ASSESSMENT: NONE - DENIES PAIN

## 2023-10-12 NOTE — ED PROVIDER NOTES
[facility]         Pt Name: Cuauhtemoc Rico   MRN: [id number]   [address] 1975   Date of evaluation: 10/11/2023   Provider: Jerrica Cage MD   PCP: Duane Dickinson, APRN - CNP   Note Started: 9:58 PM EDT 10/11/23       Chief Complaint     Oral Swelling      History of Present Illness     Cuauhtemoc Rico is a 50 y.o. male who presents with concern for pruritus and swelling of lips and tongue after an unknown food exposure. The patient has no major history of similar. He is generally been in baseline health. He was eating some chicken with a sauce tonight and subsequently developed some itching of his skin and face with redness as well as some low-grade swelling of his lips and tongue. He took a Benadryl and he experienced no further symptoms however with no clear resolution and therefore presents for evaluation. I have reviewed the nursing notes and agree unless otherwise noted. Review of Systems     Positives and pertinent negatives as per HPI. Past Medical, Surgical, Family, and Social History     He has a past medical history of Anxiety, Behavior problem, Bipolar 1 disorder ([address]), Diabetes mellitus ([address]), Drug tolerance, Facet arthropathy, Fracture of cervical vertebra, C7 ([address]), GERD (gastroesophageal reflux disease), Hyperlipemia, Hypertension, Impingement syndrome of left shoulder, Mood disorder ([address]), Myofascial pain, Pains, face, Spondylolysis, and Substance abuse/dependence. He has a past surgical history that includes hernia repair and Wrist fracture surgery. His family history includes Heart Attack in his father; Stroke in his mother. He reports that he has been smoking cigarettes. He has a 30.00 pack-year smoking history. He has never used smokeless tobacco. He reports current alcohol use of about 10.0 standard drinks of alcohol per week. He reports that he does not use drugs.     SCREENINGS:

## 2023-10-12 NOTE — ED TRIAGE NOTES
Presents with swelling of lips and tongue. Pt is able to speak and swallow secretions without difficulty, and has no SOB. Started metoprolol today and had cane's sauce with dinner, not sure if it was one of those.

## 2023-10-12 NOTE — DISCHARGE INSTRUCTIONS
You were evaluated in the emergency department for allergic reaction. Assessments and testing completed during your visit were reassuring and at this time there is no indication for further testing, treatment or admission to the hospital. Given this it is appropriate to discharge you from the emergency department. At the time of discharge we discussed the following:    Please review this visit to the emergency department with your primary provider in the next 1 week for further recommendations. I would avoid reexposure to any substance which you believe may have caused this condition. In particular I would discussed your new medications with your prescriber. I have also prescribed antihistamine and EpiPen to use if you have a recurrent severe reaction. As we discussed simple skin itching mild swelling is okay but if you have severe chest pain shortness of breath nausea and vomiting or dizziness passing out you should utilize the EpiPen for severe allergy reaction/anaphylaxis. Please note that sometimes it is difficult to diagnose a medical condition early in the disease process before the disease is fully manifest. Because of this, should you develop any new or worsening symptoms, you may return at any time to the emergency department for another evaluation. If available you are also recommended to review this visit with your primary care physician or other medical provider in the next 7 days. Thank you for allowing us to care for you today.

## 2024-02-04 NOTE — ED NOTES
C/o generalized abd pain with intermittent N/V x approx 2 days.  Pt denies fevers, SOB, CP or further c/o's      Yuriy Abreu RN  09/17/22 4602 General